# Patient Record
Sex: FEMALE | Race: BLACK OR AFRICAN AMERICAN | NOT HISPANIC OR LATINO | Employment: FULL TIME | URBAN - METROPOLITAN AREA
[De-identification: names, ages, dates, MRNs, and addresses within clinical notes are randomized per-mention and may not be internally consistent; named-entity substitution may affect disease eponyms.]

---

## 2017-02-02 ENCOUNTER — LAB VISIT (OUTPATIENT)
Dept: LAB | Facility: HOSPITAL | Age: 27
End: 2017-02-02
Attending: OBSTETRICS & GYNECOLOGY
Payer: COMMERCIAL

## 2017-02-02 ENCOUNTER — OFFICE VISIT (OUTPATIENT)
Dept: OBSTETRICS AND GYNECOLOGY | Facility: CLINIC | Age: 27
End: 2017-02-02
Payer: COMMERCIAL

## 2017-02-02 VITALS
HEIGHT: 67 IN | BODY MASS INDEX: 41.46 KG/M2 | SYSTOLIC BLOOD PRESSURE: 124 MMHG | DIASTOLIC BLOOD PRESSURE: 74 MMHG | WEIGHT: 264.13 LBS

## 2017-02-02 DIAGNOSIS — Z11.3 SCREENING FOR GONORRHEA: ICD-10-CM

## 2017-02-02 DIAGNOSIS — N76.0 BACTERIAL VAGINOSIS: ICD-10-CM

## 2017-02-02 DIAGNOSIS — Z12.4 SCREENING FOR CERVICAL CANCER: ICD-10-CM

## 2017-02-02 DIAGNOSIS — N30.00 ACUTE CYSTITIS WITHOUT HEMATURIA: ICD-10-CM

## 2017-02-02 DIAGNOSIS — B96.89 BACTERIAL VAGINOSIS: ICD-10-CM

## 2017-02-02 DIAGNOSIS — Z01.419 ENCOUNTER FOR GYNECOLOGICAL EXAMINATION (GENERAL) (ROUTINE) WITHOUT ABNORMAL FINDINGS: Primary | ICD-10-CM

## 2017-02-02 PROCEDURE — 87186 SC STD MICRODIL/AGAR DIL: CPT

## 2017-02-02 PROCEDURE — 87088 URINE BACTERIA CULTURE: CPT

## 2017-02-02 PROCEDURE — 87480 CANDIDA DNA DIR PROBE: CPT

## 2017-02-02 PROCEDURE — 88175 CYTOPATH C/V AUTO FLUID REDO: CPT

## 2017-02-02 PROCEDURE — 86592 SYPHILIS TEST NON-TREP QUAL: CPT

## 2017-02-02 PROCEDURE — 99999 PR PBB SHADOW E&M-EST. PATIENT-LVL II: CPT | Mod: PBBFAC,,, | Performed by: OBSTETRICS & GYNECOLOGY

## 2017-02-02 PROCEDURE — 86703 HIV-1/HIV-2 1 RESULT ANTBDY: CPT

## 2017-02-02 PROCEDURE — 87591 N.GONORRHOEAE DNA AMP PROB: CPT

## 2017-02-02 PROCEDURE — 87086 URINE CULTURE/COLONY COUNT: CPT

## 2017-02-02 PROCEDURE — 99395 PREV VISIT EST AGE 18-39: CPT | Mod: S$GLB,,, | Performed by: OBSTETRICS & GYNECOLOGY

## 2017-02-02 PROCEDURE — 87077 CULTURE AEROBIC IDENTIFY: CPT

## 2017-02-02 PROCEDURE — 36415 COLL VENOUS BLD VENIPUNCTURE: CPT | Mod: PO

## 2017-02-02 PROCEDURE — 87529 HSV DNA AMP PROBE: CPT

## 2017-02-02 RX ORDER — PHENTERMINE HYDROCHLORIDE 37.5 MG/1
37.5 TABLET ORAL DAILY
Refills: 0 | COMMUNITY
Start: 2016-11-21 | End: 2020-01-21

## 2017-02-02 NOTE — MR AVS SNAPSHOT
"    [facility]  [address]  [place] [address]  Phone: [phone]                  Racquel Yepez   2017 3:30 PM   Office Visit    Description:  Female : 1990   Provider:  Zulema Jarvis MD   Department:  [facility]           Reason for Visit     Annual Exam     STD CHECK           Diagnoses this Visit        Comments    Encounter for gynecological examination (general) (routine) without abnormal findings         Screening for cervical cancer         Screening for gonorrhea                To Do List           Future Appointments        Provider Department Dept Phone    3/1/2017 4:30 PM Zulema Jarvis MD [facility] - OB/ [phone]      Goals (5 Years of Data)     None      [facility]     [facility] On Call Nurse Care Line -  Assistance  Registered nurses in the [facility] provide clinical advisement, health education, appointment booking, and other advisory services.  Call for this free service at [phone].             Medications                Verify that the below list of medications is an accurate representation of the medications you are currently taking.  If none reported, the list may be blank. If incorrect, please contact your healthcare provider. Carry this list with you in case of emergency.           Current Medications     norethindrone-ethinyl estradiol (MICROGESTIN ) 1-20 mg-mcg per tablet Take 1 tablet by mouth once daily.    phentermine (ADIPEX-P) 37.5 mg tablet Take 37.5 mg by mouth once daily.           Clinical Reference Information           Your Vitals Were     BP Height Weight Last Period BMI    124/74 (BP Location: Right arm, Patient Position: Sitting, BP Method: Manual) 5' 7" (1.702 m) 119.8 kg (264 lb 1.8 oz) 2017 41.37 kg/m2      Blood Pressure          Most Recent Value    BP  124/74      Allergies as of 2017     No Known Allergies      Immunizations Administered " on Date of Encounter - 2/2/2017     None      Orders Placed During Today's Visit      Normal Orders This Visit    C. trachomatis/N. gonorrhoeae by AMP DNA Cervicovaginal     Liquid-based pap smear, screening     Future Labs/Procedures Expected by Expires    Herpes simplex Virus (HSV) Type 1 & 2 DNA by PCR  2/2/2017 4/3/2018    HIV-1 and HIV-2 antibodies  2/2/2017 4/3/2018    RPR  2/2/2017 4/3/2018      [facility] Sign-Up     Activating your [facility] account is as easy as 1-2-3!     1) Visit [email or web address], select Sign Up Now, enter this activation code and your date of birth, then select Next.  [email or web address]  Expires: 3/19/2017  4:20 PM      2) Create a username and password to use when you visit [facility] in the future and select a security question in case you lose your password and select Next.    3) Enter your e-mail address and click Sign Up!    Additional Information  If you have questions, please e-mail [email or web address] or call [phone] to talk to our [facility] staff. Remember, [facility] is NOT to be used for urgent needs. For medical emergencies, dial 911.         Language Assistance Services     ATTENTION: Language assistance services are available, free of charge. Please call [phone].      ATENCIÓN: Si habla español, tiene a herring disposición servicios gratuitos de asistencia lingüística. Llame al [phone].     CHÚ Ý: N?u b?n nói Ti?ng Vi?t, có các d?ch v? h? tr? ngôn ng? mi?n phí dành cho b?n. G?i s? [phone].         [facility] complies with applicable Federal civil rights laws and does not discriminate on the basis of race, color, national origin, age, disability, or sex.

## 2017-02-03 LAB
HIV 1+2 AB+HIV1 P24 AG SERPL QL IA: NEGATIVE
RPR SER QL: NORMAL

## 2017-02-04 LAB
CANDIDA RRNA VAG QL PROBE: NEGATIVE
G VAGINALIS RRNA GENITAL QL PROBE: POSITIVE
HSV-1 DNA BY PCR: NEGATIVE
HSV-2 DNA BY PCR: NEGATIVE
T VAGINALIS RRNA GENITAL QL PROBE: NEGATIVE

## 2017-02-04 NOTE — PROGRESS NOTES
Subjective:       Patient ID: Racquel Yepez is a 26 y.o. female.    Chief Complaint:  Annual Exam and STD CHECK      History of Present Illness  HPI  Annual Exam-Premenopausal  Patient presents for annual exam. The patient has no complaints today. The patient is sexually active. --deneis vaginal discharge but wants std testing; GYN screening history: last pap: approximate date  and was normal. The patient wears seatbelts: yes. The patient participates in regular exercise: yes. Has the patient ever been transfused or tattooed?: yes. The patient reports that there is not domestic violence in her life.      Currently has nexplanon in place; feels her weigth gain and inability to lose weight are tied to nexplanon; currently on adipex; but not exercising  Reports was previously able to lose weight on microgestin        GYN & OB History  Patient's last menstrual period was 2017.   Date of Last Pap: No result found    OB History    Para Term  AB SAB TAB Ectopic Multiple Living   2 1 1 0 1 0 0 1 0 1      # Outcome Date GA Lbr Jovany/2nd Weight Sex Delivery Anes PTL Lv   2 Term 01/31/15    F CS-LTranv   Y   1 Ectopic 14 4w0d      N N          Review of Systems  Review of Systems   Constitutional: Negative for activity change, appetite change, chills, diaphoresis, fatigue, fever and unexpected weight change.   HENT: Negative for mouth sores and tinnitus.    Eyes: Negative for discharge and visual disturbance.   Respiratory: Negative for cough, shortness of breath and wheezing.    Cardiovascular: Negative for chest pain, palpitations and leg swelling.   Gastrointestinal: Negative for abdominal pain, bloating, blood in stool, constipation, diarrhea, nausea and vomiting.   Endocrine: Negative for diabetes, hair loss, hot flashes, hyperthyroidism and hypothyroidism.   Genitourinary: Negative for decreased libido, dyspareunia, dysuria, flank pain, frequency, genital sores, hematuria, menorrhagia,  menstrual problem, pelvic pain, urgency, vaginal bleeding, vaginal discharge, vaginal pain, dysmenorrhea, urinary incontinence, postcoital bleeding, postmenopausal bleeding and vaginal odor.   Musculoskeletal: Negative for back pain and myalgias.   Skin:  Negative for rash, no acne and hair changes.   Neurological: Negative for seizures, syncope, numbness and headaches.   Hematological: Negative for adenopathy. Does not bruise/bleed easily.   Psychiatric/Behavioral: Negative for depression and sleep disturbance. The patient is not nervous/anxious.    Breast: Negative for breast mass, breast pain, nipple discharge and skin changes          Objective:    Physical Exam:   Constitutional: She appears well-developed.     Eyes: Conjunctivae and EOM are normal. Pupils are equal, round, and reactive to light.    Neck: Normal range of motion. Neck supple.     Pulmonary/Chest: Effort normal. Right breast exhibits no mass, no nipple discharge, no skin change and no tenderness. Left breast exhibits no mass, no nipple discharge, no skin change and no tenderness. Breasts are symmetrical.        Abdominal: Soft.     Genitourinary: Rectum normal, vagina normal and uterus normal. Pelvic exam was performed with patient supine. Cervix is normal. Right adnexum displays no mass and no tenderness. Left adnexum displays no mass and no tenderness. No erythema, bleeding, rectocele, cystocele or unspecified prolapse of vaginal walls in the vagina. No vaginal discharge found. Labial bartholins normal.       Uterus Size: 6 cm   Musculoskeletal: Normal range of motion.       Neurological: She is alert.    Skin: Skin is warm.    Psychiatric: She has a normal mood and affect.          Assessment:        1. Acute cystitis without hematuria    2. Encounter for gynecological examination (general) (routine) without abnormal findings    3. Screening for cervical cancer    4. Screening for gonorrhea    5. Bacterial vaginosis               Plan:       Continue annual well woman exam.   pap today  Gc/ct /affirm today  Return for nexplanon removal

## 2017-02-05 ENCOUNTER — TELEPHONE (OUTPATIENT)
Dept: OBSTETRICS AND GYNECOLOGY | Facility: CLINIC | Age: 27
End: 2017-02-05

## 2017-02-05 DIAGNOSIS — B96.89 BACTERIAL VAGINOSIS: Primary | ICD-10-CM

## 2017-02-05 DIAGNOSIS — N76.0 BACTERIAL VAGINOSIS: Primary | ICD-10-CM

## 2017-02-05 DIAGNOSIS — N39.0 URINARY TRACT INFECTION WITHOUT HEMATURIA, SITE UNSPECIFIED: ICD-10-CM

## 2017-02-06 LAB
BACTERIA UR CULT: NORMAL
C TRACH DNA SPEC QL NAA+PROBE: NEGATIVE
N GONORRHOEA DNA SPEC QL NAA+PROBE: NEGATIVE

## 2017-02-06 RX ORDER — NITROFURANTOIN (MACROCRYSTALS) 100 MG/1
100 CAPSULE ORAL EVERY 12 HOURS
Qty: 10 CAPSULE | Refills: 0 | Status: SHIPPED | OUTPATIENT
Start: 2017-02-06 | End: 2017-02-11

## 2017-02-06 RX ORDER — METRONIDAZOLE 500 MG/1
500 TABLET ORAL EVERY 12 HOURS
Qty: 14 TABLET | Refills: 0 | Status: SHIPPED | OUTPATIENT
Start: 2017-02-06 | End: 2017-02-13

## 2017-02-06 NOTE — TELEPHONE ENCOUNTER
Advised pt of positive culture for BV, she states she wants the pill for treatment.  ANJU Houston

## 2017-02-06 NOTE — TELEPHONE ENCOUNTER
Please advise her vaginal culture is positive for bacterial vaginosis.  This is overgrowth of your normal bacteria.  Does she prefer pills or vaginal gel for treatment?

## 2017-05-10 ENCOUNTER — TELEPHONE (OUTPATIENT)
Dept: OBSTETRICS AND GYNECOLOGY | Facility: CLINIC | Age: 27
End: 2017-05-10

## 2017-05-10 NOTE — TELEPHONE ENCOUNTER
----- Message from Chyna Parks sent at 5/10/2017 12:05 PM CDT -----  Contact: self [phone]  States that she needs to reschedule her procedure for nexplanon removal. Please call back at [phone]//thank you acc

## 2017-05-24 ENCOUNTER — PROCEDURE VISIT (OUTPATIENT)
Dept: OBSTETRICS AND GYNECOLOGY | Facility: CLINIC | Age: 27
End: 2017-05-24
Payer: COMMERCIAL

## 2017-05-24 VITALS
SYSTOLIC BLOOD PRESSURE: 104 MMHG | HEIGHT: 67 IN | BODY MASS INDEX: 44.63 KG/M2 | DIASTOLIC BLOOD PRESSURE: 68 MMHG | WEIGHT: 284.38 LBS

## 2017-05-24 DIAGNOSIS — Z30.46 ENCOUNTER FOR NEXPLANON REMOVAL: Primary | ICD-10-CM

## 2017-05-24 DIAGNOSIS — Z30.011 ENCOUNTER FOR INITIAL PRESCRIPTION OF CONTRACEPTIVE PILLS: ICD-10-CM

## 2017-05-24 PROCEDURE — 11982 REMOVE DRUG IMPLANT DEVICE: CPT | Mod: S$GLB,,, | Performed by: OBSTETRICS & GYNECOLOGY

## 2017-05-24 RX ORDER — NORETHINDRONE ACETATE AND ETHINYL ESTRADIOL .02; 1 MG/1; MG/1
1 TABLET ORAL DAILY
Qty: 28 TABLET | Refills: 11 | Status: SHIPPED | OUTPATIENT
Start: 2017-05-24 | End: 2017-06-23

## 2017-05-24 NOTE — PROCEDURES
Procedures   NEXPLANON REMOVAL    Surgeon: Dr. Zulema Jarvis  Date of procedure: 05/24/2017    Pre-op diagnosis:   Encounter Diagnoses   Name Primary?    Encounter for Nexplanon removal Yes    Encounter for initial prescription of contraceptive pills          Exam: Nexplanon easily palpated in left upper extremity    Procedure: The skin over the distal tip of the nexplanon was prepped with betadine.    2 cc 1%   lidocaine without epinephrine was injected subcutaneously.  A 3mm incision was made in the left arm at the distal  tip of the implant using a #11 scalpel.  The device was pushed toward the incision, grasped with hemostats, and was removed intact.  Hemostasis was achieved with gentle pressure.  The wound was dressed with a steri strip and  band-aid.  The patient tolerated the procedure well.    Post-procedure counseling: The patient was given pain, fever, and bleeding precautions.  Advised to use tylenol and ibuprofen prn pain.  Pt advised to  and start ocp today  RTC: prn    Post-op diagnosis:   Encounter Diagnoses   Name Primary?    Encounter for Nexplanon removal Yes    Encounter for initial prescription of contraceptive pills

## 2018-07-19 ENCOUNTER — OFFICE VISIT (OUTPATIENT)
Dept: OBSTETRICS AND GYNECOLOGY | Facility: CLINIC | Age: 28
End: 2018-07-19
Payer: COMMERCIAL

## 2018-07-19 VITALS
BODY MASS INDEX: 36.99 KG/M2 | HEIGHT: 67 IN | DIASTOLIC BLOOD PRESSURE: 86 MMHG | WEIGHT: 235.69 LBS | SYSTOLIC BLOOD PRESSURE: 130 MMHG

## 2018-07-19 DIAGNOSIS — Z30.017 ENCOUNTER FOR INITIAL PRESCRIPTION OF IMPLANTABLE SUBDERMAL CONTRACEPTIVE: ICD-10-CM

## 2018-07-19 DIAGNOSIS — Z00.00 PREVENTATIVE HEALTH CARE: ICD-10-CM

## 2018-07-19 DIAGNOSIS — Z01.419 ENCOUNTER FOR GYNECOLOGICAL EXAMINATION (GENERAL) (ROUTINE) WITHOUT ABNORMAL FINDINGS: Primary | ICD-10-CM

## 2018-07-19 PROCEDURE — 3008F BODY MASS INDEX DOCD: CPT | Mod: CPTII,S$GLB,, | Performed by: NURSE PRACTITIONER

## 2018-07-19 PROCEDURE — 99395 PREV VISIT EST AGE 18-39: CPT | Mod: S$GLB,,, | Performed by: NURSE PRACTITIONER

## 2018-07-19 PROCEDURE — 99999 PR PBB SHADOW E&M-EST. PATIENT-LVL II: CPT | Mod: PBBFAC,,, | Performed by: NURSE PRACTITIONER

## 2018-07-19 NOTE — PROGRESS NOTES
"CC: Well woman exam    Racquel Yepez is a 27 y.o. female  presents for well woman exam.  LMP: Patient's last menstrual period was 2018..  Last pap exam was normal, . S/P Laparotomy for left corneal pregnancy. Is sexually active, no birth control. Wants to consider the Nexplanon.     Past Medical History:   Diagnosis Date    Abnormal Pap smear     colposcopy-- normal since    Anemia     Ectopic pregnancy      Past Surgical History:   Procedure Laterality Date    ABSCESS DRAINAGE  2016    left breast    CHOLECYSTECTOMY      ECTOPIC PREGNANCY SURGERY  3/3/2014    Laparotomy for left cornual pregnancy     gastric sleeve  2017     Social History     Social History    Marital status: Single     Spouse name: N/A    Number of children: N/A    Years of education: N/A     Occupational History    Not on file.     Social History Main Topics    Smoking status: Never Smoker    Smokeless tobacco: Never Used    Alcohol use Yes      Comment: socially    Drug use: No    Sexual activity: Yes     Partners: Male     Birth control/ protection: Implant     Other Topics Concern    Not on file     Social History Narrative    No narrative on file     Family History   Problem Relation Age of Onset    Diabetes Father     Hypertension Father     Diabetes Mother     Breast cancer Neg Hx     Colon cancer Neg Hx     Ovarian cancer Neg Hx     Stroke Neg Hx      OB History      Para Term  AB Living    2 1 1 0 1 1    SAB TAB Ectopic Multiple Live Births    0 0 1 0 2          /86   Ht 5' 7" (1.702 m)   Wt 106.9 kg (235 lb 10.8 oz)   LMP 2018   BMI 36.91 kg/m²       ROS:  GENERAL:    SKIN: Denies rash or lesions.   HEAD: Denies head injury or headache.   NODES: Denies enlarged lymph nodes.   CHEST: Denies chest pain or shortness of breath.   CARDIOVASCULAR: Denies palpitations or left sided chest pain.   ABDOMEN: No abdominal pain, constipation, diarrhea, nausea, " vomiting or rectal bleeding.   URINARY: No frequency, dysuria, hematuria, or burning on urination.  REPRODUCTIVE: See HPI.   BREASTS: The patient performs breast self-examination and denies pain, lumps, or nipple discharge.   HEMATOLOGIC: No easy bruisability or excessive bleeding.   MUSCULOSKELETAL: Denies joint pain or swelling.   NEUROLOGIC: Denies syncope or weakness.   PSYCHIATRIC: Denies depression, anxiety or mood swings.    PHYSICAL EXAM:  APPEARANCE: Well nourished, well developed, in no acute distress.  AFFECT: WNL, alert and oriented x 3  SKIN: No acne or hirsutism  NECK: Neck symmetric without masses or thyromegaly  NODES: No inguinal, cervical, axillary, or femoral lymph node enlargement  CHEST: Good respiratory effect  ABDOMEN: Soft.  No tenderness or masses.  No hepatosplenomegaly.  No hernias.  BREASTS: Symmetrical, no skin changes or visible lesions.  No palpable masses, nipple discharge bilaterally.  PELVIC: Normal external genitalia without lesions.  Normal hair distribution.  Adequate perineal body, normal urethral meatus.  Vagina moist and well rugated without lesions or discharge.  Cervix pink, without lesions, discharge or tenderness.  No significant cystocele or rectocele.  Bimanual exam shows uterus to be normal size, regular, mobile and nontender.  Adnexa without masses or tenderness.    EXTREMITIES: No edema.    PLAN:  Nexplanon ordered  Patient was counseled today on A.C.S. Pap guidelines and recommendations for yearly pelvic exams, mammograms and monthly self breast exams; to see her PCP for other health maintenance.

## 2018-08-08 ENCOUNTER — TELEPHONE (OUTPATIENT)
Dept: OBSTETRICS AND GYNECOLOGY | Facility: CLINIC | Age: 28
End: 2018-08-08

## 2018-08-08 NOTE — TELEPHONE ENCOUNTER
----- Message from Molly Velasquez sent at 8/8/2018  4:11 PM CDT -----  Contact: pt  States she's calling to see if her birth control has came in. Please call pt at [phone]. Thank you

## 2018-08-10 ENCOUNTER — TELEPHONE (OUTPATIENT)
Dept: OBSTETRICS AND GYNECOLOGY | Facility: CLINIC | Age: 28
End: 2018-08-10

## 2018-08-10 NOTE — TELEPHONE ENCOUNTER
Nexplanon arrived at [facility]. Scheduled for 8/24/18 745 AM. Patient verbalized understanding

## 2018-08-15 ENCOUNTER — PROCEDURE VISIT (OUTPATIENT)
Dept: OBSTETRICS AND GYNECOLOGY | Facility: CLINIC | Age: 28
End: 2018-08-15
Payer: COMMERCIAL

## 2018-08-15 ENCOUNTER — TELEPHONE (OUTPATIENT)
Dept: OBSTETRICS AND GYNECOLOGY | Facility: CLINIC | Age: 28
End: 2018-08-15

## 2018-08-15 VITALS
WEIGHT: 226.63 LBS | BODY MASS INDEX: 35.57 KG/M2 | SYSTOLIC BLOOD PRESSURE: 130 MMHG | DIASTOLIC BLOOD PRESSURE: 84 MMHG | HEIGHT: 67 IN

## 2018-08-15 DIAGNOSIS — Z30.017 NEXPLANON INSERTION: Primary | ICD-10-CM

## 2018-08-15 PROCEDURE — 11981 INSERTION DRUG DLVR IMPLANT: CPT | Mod: S$GLB,,, | Performed by: NURSE PRACTITIONER

## 2018-08-15 NOTE — PROCEDURES
Procedures   Nexplanon INSERTION:    PRE-Nexplanon INSERTION COUNSELING:  All contraceptive options were reviewed and the patient chooses Nexplanon.  Patients history was reviewed and there were no contraindications to Nexplanon.  The procedure and minimal risks of pain, bleeding, bruising and infection at the insertion site discussed. Possible irregular menstrual bleeding pattern versus amenorrhea was explained.  No protection against STDs discussed.  Written information provided; all questions answered and patient agrees to proceed.  Consent signed and scanned into computer.    EXAM:  With patient in supine position the nondominant left arm was flexed at the elbow and externally rotated.  The insertion site was identified 6-8 cm above the elbow crease at the inner side of the upper arm overlying the groove between biceps and triceps.    PROCEDURE:  The insertion site was prepped with antiseptic and injected with 3 cc of 1% Xylocaine without epinephrine subq along the planned insertion canal.    Using sterile technique the Nexplanon applicator was visually verified and removed from the blister pack.  The needle tip was inserted bevel side up at a 20 degree angle to penetrate the skin.  The applicator was lowered parallel to the arm and the skin was tented with the needle.  Once the needle was completely inserted, the Nexplanon was then deployed into the subcutaneous space.  The implant was palpable after insertion.  A steri strip was placed over the insertion site.  The patient tolerated the procedure well.    ASSESSMENT:  Nexplanon Insertion    POST Nexplanon INSERTION COUNSELING:  Manage post Implanon placement arm pain with NSAIDs  Keep arm elevated and apply intermittent ice packs to decrease pain and bruising for 24 Hours.  May remove bandage in 24 hours.  Nexplanon danger signs (worsening pain at insertion site, bleeding through bandage, redness and/or pus drainage at insertion site).  Removal in 3  years.    Lot # [id number]

## 2018-08-15 NOTE — TELEPHONE ENCOUNTER
Spoke with pt, regarding rescheduling appt for Nexplanon placement. Pt stated that her cycle began today. Pt rescheduled.

## 2018-08-15 NOTE — TELEPHONE ENCOUNTER
----- Message from Dorene Biggs sent at 8/15/2018  8:11 AM CDT -----  Pt at [phone]//states she has an appt scheduled for a procedure on 8/24/18//to have the Nexplanon put in//she started her period today//please call to discuss//thanks//[facility]

## 2019-04-24 ENCOUNTER — OFFICE VISIT (OUTPATIENT)
Dept: DERMATOLOGY | Facility: CLINIC | Age: 29
End: 2019-04-24
Payer: COMMERCIAL

## 2019-04-24 DIAGNOSIS — L81.0 POST-INFLAMMATORY HYPERPIGMENTATION: Primary | ICD-10-CM

## 2019-04-24 PROCEDURE — 99999 PR PBB SHADOW E&M-EST. PATIENT-LVL II: ICD-10-PCS | Mod: PBBFAC,,, | Performed by: DERMATOLOGY

## 2019-04-24 PROCEDURE — 99203 PR OFFICE/OUTPT VISIT, NEW, LEVL III, 30-44 MIN: ICD-10-PCS | Mod: S$GLB,,, | Performed by: DERMATOLOGY

## 2019-04-24 PROCEDURE — 99999 PR PBB SHADOW E&M-EST. PATIENT-LVL II: CPT | Mod: PBBFAC,,, | Performed by: DERMATOLOGY

## 2019-04-24 PROCEDURE — 99203 OFFICE O/P NEW LOW 30 MIN: CPT | Mod: S$GLB,,, | Performed by: DERMATOLOGY

## 2019-04-24 RX ORDER — HYDROQUINONE 40 MG/G
CREAM TOPICAL
Qty: 28 G | Refills: 1 | Status: SHIPPED | OUTPATIENT
Start: 2019-04-24 | End: 2020-01-21

## 2019-04-24 NOTE — PROGRESS NOTES
Subjective:       Patient ID:  Racquel Yepez is a 28 y.o. female who presents for   Chief Complaint   Patient presents with    Skin Discoloration     all over body x several years, no tx     History of Present Illness: The patient presents with chief complaint of dark marks.  Location: all over body  Duration: several years  Signs/Symptoms: none    Prior treatments: ambi, jamil's      Skin Discoloration         Review of Systems   Constitutional: Negative for fever and chills.   Gastrointestinal: Negative for nausea and vomiting.   Skin: Negative for daily sunscreen use, activity-related sunscreen use and recent sunburn.   Hematologic/Lymphatic: Does not bruise/bleed easily.        Objective:    Physical Exam   Constitutional: She appears well-developed and well-nourished. No distress.   Neurological: She is alert and oriented to person, place, and time. She is not disoriented.   Psychiatric: She has a normal mood and affect.   Skin:   Areas Examined (abnormalities noted in diagram):   Head / Face Inspection Performed  Neck Inspection Performed  Chest / Axilla Inspection Performed  Abdomen Inspection Performed  Back Inspection Performed  RUE Inspected  LUE Inspection Performed  RLE Inspected  LLE Inspection Performed  Nails and Digits Inspection Performed                  Assessment / Plan:        Post-inflammatory hyperpigmentation  -     hydroquinone 4 % Crea; Apply small amount twice daily to dark spots  Dispense: 28 g; Refill: 1  -     On legs, back and face.  Recommend daily sunscreen for face.              Follow up if symptoms worsen or fail to improve.

## 2020-01-21 ENCOUNTER — OFFICE VISIT (OUTPATIENT)
Dept: OBSTETRICS AND GYNECOLOGY | Facility: CLINIC | Age: 30
End: 2020-01-21
Payer: COMMERCIAL

## 2020-01-21 VITALS
BODY MASS INDEX: 36.43 KG/M2 | DIASTOLIC BLOOD PRESSURE: 80 MMHG | SYSTOLIC BLOOD PRESSURE: 118 MMHG | HEIGHT: 67 IN | WEIGHT: 232.13 LBS

## 2020-01-21 DIAGNOSIS — Z72.51 HIGH RISK HETEROSEXUAL BEHAVIOR: ICD-10-CM

## 2020-01-21 DIAGNOSIS — Z30.46 ENCOUNTER FOR SURVEILLANCE OF IMPLANTABLE SUBDERMAL CONTRACEPTIVE: ICD-10-CM

## 2020-01-21 DIAGNOSIS — Z12.4 SCREENING FOR CERVICAL CANCER: ICD-10-CM

## 2020-01-21 DIAGNOSIS — Z01.419 ENCOUNTER FOR GYNECOLOGICAL EXAMINATION (GENERAL) (ROUTINE) WITHOUT ABNORMAL FINDINGS: Primary | ICD-10-CM

## 2020-01-21 PROCEDURE — 99395 PR PREVENTIVE VISIT,EST,18-39: ICD-10-PCS | Mod: S$GLB,,, | Performed by: OBSTETRICS & GYNECOLOGY

## 2020-01-21 PROCEDURE — 99395 PREV VISIT EST AGE 18-39: CPT | Mod: S$GLB,,, | Performed by: OBSTETRICS & GYNECOLOGY

## 2020-01-21 PROCEDURE — 99999 PR PBB SHADOW E&M-EST. PATIENT-LVL III: CPT | Mod: PBBFAC,,, | Performed by: OBSTETRICS & GYNECOLOGY

## 2020-01-21 PROCEDURE — 99999 PR PBB SHADOW E&M-EST. PATIENT-LVL III: ICD-10-PCS | Mod: PBBFAC,,, | Performed by: OBSTETRICS & GYNECOLOGY

## 2020-01-21 PROCEDURE — 88175 CYTOPATH C/V AUTO FLUID REDO: CPT

## 2020-01-21 PROCEDURE — 87491 CHLMYD TRACH DNA AMP PROBE: CPT

## 2020-01-21 NOTE — PROGRESS NOTES
Subjective:       Patient ID: Racquel Yepez is a 29 y.o. female.    Chief Complaint:  Well Woman      History of Present Illness  HPI  Annual Exam-Premenopausal  Patient presents for annual exam. The patient has no complaints today--wants std testing. The patient is sexually active--nexplanon for contraception; 2 partners in 12 mo; . GYN screening history: last pap: approximate date  and was normal. The patient wears seatbelts: yes. The patient participates in regular exercise: yes.--dangelo by rt knee injury;  Has the patient ever been transfused or tattooed?: yes. --+tattooes; The patient reports that there is not domestic violence in her life.    Menses irreg but monthly, flow 4 days; reg tampon, change q 3-4 hrs; min dysmenorrhea;         GYN & OB History  Patient's last menstrual period was 2019.   Date of Last Pap: 2017    OB History    Para Term  AB Living   2 1 1 0 1 1   SAB TAB Ectopic Multiple Live Births   0 0 1 0 2      # Outcome Date GA Lbr Jovany/2nd Weight Sex Delivery Anes PTL Lv   2 Term 01/31/15    F CS-LTranv   HAL   1 Ectopic 14 4w0d      N DEC       Review of Systems  Review of Systems   All other systems reviewed and are negative.          Objective:      Physical Exam:   Constitutional: She appears well-developed.     Eyes: Pupils are equal, round, and reactive to light. Conjunctivae and EOM are normal.    Neck: Normal range of motion. Neck supple.     Pulmonary/Chest: Effort normal. Right breast exhibits no mass, no nipple discharge, no skin change and no tenderness. Left breast exhibits no mass, no nipple discharge, no skin change and no tenderness. Breasts are symmetrical.        Abdominal: Soft.     Genitourinary: Rectum normal, vagina normal and uterus normal. Pelvic exam was performed with patient supine. Cervix is normal. Right adnexum displays no mass and no tenderness. Left adnexum displays no mass and no tenderness. No erythema, bleeding, rectocele,  cystocele or unspecified prolapse of vaginal walls in the vagina. No vaginal discharge (white) found. Labial bartholins normal.       Uterus Size: 6 cm   Musculoskeletal: Normal range of motion.       Neurological: She is alert.    Skin: Skin is warm.    Psychiatric: She has a normal mood and affect.           Assessment:     Encounter Diagnoses   Name Primary?    Encounter for gynecological examination (general) (routine) without abnormal findings Yes    Screening for cervical cancer     High risk heterosexual behavior     Encounter for surveillance of implantable subdermal contraceptive                  Plan:      Continue annual well woman exam.  Pap today; Reviewed updated recommendations for pap smears (every 3 years) in low risk patients.   Recommend annual pelvic exams.  Reviewed recommendations for annual CBE.  Gc/ct today  Safe sex  Aware nexplanon due to be replaced 8/15/2021  Continue diet, exercise, weight loss

## 2020-01-23 LAB
C TRACH DNA SPEC QL NAA+PROBE: NOT DETECTED
N GONORRHOEA DNA SPEC QL NAA+PROBE: NOT DETECTED

## 2020-02-11 LAB
FINAL PATHOLOGIC DIAGNOSIS: NORMAL
Lab: NORMAL

## 2020-04-30 ENCOUNTER — TELEPHONE (OUTPATIENT)
Dept: ORTHOPEDICS | Facility: CLINIC | Age: 30
End: 2020-04-30

## 2020-04-30 ENCOUNTER — PATIENT MESSAGE (OUTPATIENT)
Dept: ORTHOPEDICS | Facility: CLINIC | Age: 30
End: 2020-04-30

## 2020-04-30 NOTE — TELEPHONE ENCOUNTER
Phone would ring -- unable to leave message in regards to appointment being changed to a virtual visit - will reach out to patient via portal

## 2020-05-04 ENCOUNTER — OFFICE VISIT (OUTPATIENT)
Dept: ORTHOPEDICS | Facility: CLINIC | Age: 30
End: 2020-05-04
Payer: MEDICAID

## 2020-05-04 ENCOUNTER — TELEPHONE (OUTPATIENT)
Dept: ORTHOPEDICS | Facility: CLINIC | Age: 30
End: 2020-05-04

## 2020-05-04 ENCOUNTER — PATIENT MESSAGE (OUTPATIENT)
Dept: ORTHOPEDICS | Facility: CLINIC | Age: 30
End: 2020-05-04

## 2020-05-04 DIAGNOSIS — R52 PAIN: Primary | ICD-10-CM

## 2020-05-04 PROCEDURE — 99499 UNLISTED E&M SERVICE: CPT | Mod: 95,,, | Performed by: PHYSICIAN ASSISTANT

## 2020-05-04 PROCEDURE — 99499 NO LOS: ICD-10-PCS | Mod: 95,,, | Performed by: PHYSICIAN ASSISTANT

## 2020-05-04 NOTE — PROGRESS NOTES
Pt left the visit prior to me getting connected through the patient portal.  I have message the patient and tried contacting her via phone on multiple occasions.  Will be happy to see her later today or this week if she would like to get rescheduled

## 2020-05-04 NOTE — TELEPHONE ENCOUNTER
8:30 am   Patient had tripped in for her virtual visit.  When I went to join the call, it said she had already left.  I attempted to call the patient so that she could sign back in for the appointment.  I also offered a face-to-face appointment later today if she would like as we have started seeing patients back in the clinic again.  I will follow this up with an e-mail to the patient through the portal as she did not answer the phone.

## 2020-05-04 NOTE — TELEPHONE ENCOUNTER
9:15am  3rd attempt.  Called patient for the 3rd time to check on her with regards to completing her virtual visit.  I had to leave another VM.  We will cancel the appointment for now and have advised we will be happy to get her rescheduled if needed.

## 2020-05-04 NOTE — TELEPHONE ENCOUNTER
Vikas Clement  I was unable to get connected with you for the virtual visit today.  It had said you had already left the virtual waiting room.  I was finishing up a visit prior to your appointment when you initially logged in.  If you would still like to be seen, please sign back in for the appointment.  Also, we have appointments available later today for face-to-face visits within the clinic setting.  This decision was made late on Friday.  I would be happy to see you for your knee problems today or even sometime later this week in the clinic if you prefer.  Please respond directly to this e-mail or call [phone] and let us know your preference.  Delfina Olvera PA-C  [facility]  [facility]

## 2020-05-28 ENCOUNTER — TELEPHONE (OUTPATIENT)
Dept: ORTHOPEDICS | Facility: CLINIC | Age: 30
End: 2020-05-28

## 2020-06-01 DIAGNOSIS — M25.561 RIGHT KNEE PAIN, UNSPECIFIED CHRONICITY: Primary | ICD-10-CM

## 2020-06-04 ENCOUNTER — OFFICE VISIT (OUTPATIENT)
Dept: ORTHOPEDICS | Facility: CLINIC | Age: 30
End: 2020-06-04
Payer: MEDICAID

## 2020-06-04 ENCOUNTER — HOSPITAL ENCOUNTER (OUTPATIENT)
Dept: RADIOLOGY | Facility: HOSPITAL | Age: 30
Discharge: HOME | End: 2020-06-04
Attending: PHYSICIAN ASSISTANT
Payer: MEDICAID

## 2020-06-04 VITALS
BODY MASS INDEX: 36.41 KG/M2 | HEART RATE: 89 BPM | SYSTOLIC BLOOD PRESSURE: 124 MMHG | DIASTOLIC BLOOD PRESSURE: 80 MMHG | WEIGHT: 232 LBS | HEIGHT: 67 IN

## 2020-06-04 DIAGNOSIS — M25.561 ANTERIOR KNEE PAIN, RIGHT: Primary | ICD-10-CM

## 2020-06-04 DIAGNOSIS — M25.561 RIGHT KNEE PAIN, UNSPECIFIED CHRONICITY: ICD-10-CM

## 2020-06-04 DIAGNOSIS — M94.261 CHONDROMALACIA OF KNEE, RIGHT: ICD-10-CM

## 2020-06-04 PROCEDURE — 73562 X-RAY EXAM OF KNEE 3: CPT | Mod: TC,LT

## 2020-06-04 PROCEDURE — 73562 X-RAY EXAM OF KNEE 3: CPT | Mod: 26,LT,, | Performed by: RADIOLOGY

## 2020-06-04 PROCEDURE — 99999 PR PBB SHADOW E&M-EST. PATIENT-LVL IV: CPT | Mod: PBBFAC,,, | Performed by: PHYSICIAN ASSISTANT

## 2020-06-04 PROCEDURE — 99203 OFFICE O/P NEW LOW 30 MIN: CPT | Mod: S$PBB,,, | Performed by: PHYSICIAN ASSISTANT

## 2020-06-04 PROCEDURE — 99214 OFFICE O/P EST MOD 30 MIN: CPT | Mod: PBBFAC,25 | Performed by: PHYSICIAN ASSISTANT

## 2020-06-04 PROCEDURE — 99999 PR PBB SHADOW E&M-EST. PATIENT-LVL IV: ICD-10-PCS | Mod: PBBFAC,,, | Performed by: PHYSICIAN ASSISTANT

## 2020-06-04 PROCEDURE — 99203 PR OFFICE/OUTPT VISIT, NEW, LEVL III, 30-44 MIN: ICD-10-PCS | Mod: S$PBB,,, | Performed by: PHYSICIAN ASSISTANT

## 2020-06-04 PROCEDURE — 73564 XR KNEE ORTHO RIGHT WITH FLEXION: ICD-10-PCS | Mod: 26,RT,, | Performed by: RADIOLOGY

## 2020-06-04 PROCEDURE — 73562 XR KNEE ORTHO RIGHT WITH FLEXION: ICD-10-PCS | Mod: 26,LT,, | Performed by: RADIOLOGY

## 2020-06-04 PROCEDURE — 73564 X-RAY EXAM KNEE 4 OR MORE: CPT | Mod: 26,RT,, | Performed by: RADIOLOGY

## 2020-06-04 RX ORDER — MELOXICAM 15 MG/1
15 TABLET ORAL DAILY
Qty: 30 TABLET | Refills: 0 | Status: SHIPPED | OUTPATIENT
Start: 2020-06-04 | End: 2021-01-27

## 2020-06-04 RX ORDER — TOPIRAMATE 50 MG/1
TABLET, FILM COATED ORAL
COMMUNITY
Start: 2020-05-18 | End: 2023-06-21 | Stop reason: ALTCHOICE

## 2020-06-04 RX ORDER — PHENTERMINE HYDROCHLORIDE 37.5 MG/1
37.5 TABLET ORAL
COMMUNITY
Start: 2020-05-21 | End: 2022-09-20

## 2020-06-04 NOTE — PROGRESS NOTES
Subjective:      Patient ID: Racquel Yepez is a 29 y.o. female.    Chief Complaint: Pain of the Right Knee      HPI: Racquel Yepez  is a 29 y.o. female who c/o Pain of the Right Knee   for duration of over a year.  She has no inciting injury.  She tells me that she played 3rd base in softball when she was younger.  [facility]  She was under a different insurance at that time.  Insurance is now under Medicaid and they do not accept it.  Quality is aching and constant.  She points anteriorly as to where the pain is located.  8/10 in severity.  Alleviating factors include nothing in particular.  Aggravating factors include going up and down stairs, nighttime, squatting.  She also complains of a noise.  She states that the noise isn't painful, but she does notice it getting much worse.  She had talked about corticosteroid injections in even potential surgery with [facility].  She was given a brace, as well.  She is describing a patellar stabilization brace.  She tells me it is more uncomfortable for her to use that during activity, so she does not wear it much    Past Medical History:   Diagnosis Date    Abnormal Pap smear     colposcopy-- normal since    Anemia     Ectopic pregnancy      Past Surgical History:   Procedure Laterality Date    ABSCESS DRAINAGE  2016    left breast     SECTION, LOW TRANSVERSE  2015    CHOLECYSTECTOMY      ECTOPIC PREGNANCY SURGERY  3/3/2014    Laparotomy for left cornual pregnancy     gastric sleeve  2017     Family History   Problem Relation Age of Onset    Diabetes Father     Hypertension Father     Diabetes Mother     Breast cancer Neg Hx     Colon cancer Neg Hx     Ovarian cancer Neg Hx     Stroke Neg Hx      Social History     Socioeconomic History    Marital status: Single     Spouse name: Not on file    Number of children: Not on file    Years of education:  Not on file    Highest education level: Not on file   Occupational History    Not on file   Social Needs    Financial resource strain: Not on file    Food insecurity:     Worry: Not on file     Inability: Not on file    Transportation needs:     Medical: Not on file     Non-medical: Not on file   Tobacco Use    Smoking status: Never Smoker    Smokeless tobacco: Never Used   Substance and Sexual Activity    Alcohol use: Yes     Frequency: 2-4 times a month     Drinks per session: 1 or 2     Binge frequency: Never     Comment: socially    Drug use: No    Sexual activity: Yes     Partners: Male     Birth control/protection: Implant   Lifestyle    Physical activity:     Days per week: Not on file     Minutes per session: Not on file    Stress: Not on file   Relationships    Social connections:     Talks on phone: Not on file     Gets together: Not on file     Attends Congregation service: Not on file     Active member of club or organization: Not on file     Attends meetings of clubs or organizations: Not on file     Relationship status: Not on file   Other Topics Concern    Not on file   Social History Narrative    Not on file     Medication List with Changes/Refills   New Medications    MELOXICAM (MOBIC) 15 MG TABLET    Take 1 tablet (15 mg total) by mouth once daily. Take with food.  Discontinue if you develop GI side effects.   Current Medications    ETONOGESTREL (NEXPLANON) 68 MG IMPL SUBDERMAL DEVICE    68 mg by Subdermal route.    PHENTERMINE (ADIPEX-P) 37.5 MG TABLET    Take 37.5 mg by mouth before breakfast.    TOPIRAMATE (TOPAMAX) 50 MG TABLET    TAKE ONE TABLET BY MOUTH ONE TIME DAILY     Review of patient's allergies indicates:  No Known Allergies    Review of Systems   Constitution: Negative for fever.   Cardiovascular: Negative for chest pain.   Respiratory: Negative for cough and shortness of breath.    Skin: Negative for rash.   Musculoskeletal: Positive for joint pain. Negative for joint  swelling and stiffness.   Gastrointestinal: Negative for heartburn.   Neurological: Negative for headaches and numbness.         Objective:        General    Nursing note and vitals reviewed.  Constitutional: She is oriented to person, place, and time. She appears well-developed and well-nourished.   HENT:   Head: Normocephalic and atraumatic.   Eyes: EOM are normal.   Cardiovascular: Normal rate and regular rhythm.    Pulmonary/Chest: Effort normal.   Abdominal: Soft.   Neurological: She is alert and oriented to person, place, and time.   Psychiatric: She has a normal mood and affect. Her behavior is normal.     General Musculoskeletal Exam   Gait: normal       Right Knee Exam     Inspection   Erythema: absent  Swelling: absent  Effusion: absent  Deformity: absent  Bruising: absent    Crepitus   The patient has crepitus of the patella (severe PF crepitus).    Range of Motion   Extension: normal   Flexion: normal     Tests   Meniscus   Ayana:  Medial - negative Lateral - negative  Ligament Examination Lachman: normal (-1 to 2mm) PCL-Posterior Drawer: normal (0 to 2mm)     MCL - Valgus: normal (0 to 2mm)  LCL - Varus: normal  Patella   Patellar apprehension: negative  Patellar Tracking: normal  Patellar Grind: positive    Other   Meniscal Cyst: absent  Popliteal (Baker's) Cyst: absent  Sensation: normal    Comments:  Comp soft, cap refill < 2 sec.    Left Knee Exam     Range of Motion   Extension: normal   Flexion: normal     Tests   Stability Lachman: normal (-1 to 2mm) PCL-Posterior Drawer: normal (0 to 2mm)  MCL - Valgus: normal (0 to 2mm)  LCL - Varus: normal (0 to 2mm)    Other   Sensation: normal    Muscle Strength   Right Lower Extremity   Quadriceps:  5/5   Hamstrin/5   Left Lower Extremity   Quadriceps:  5/5   Hamstrin/5     Vascular Exam       Edema  Right Lower Leg: absent  Left Lower Leg: absent              Xray images and report were reviewed today.  I agree with the radiologist's  interpretation.    X-ray Knee Ortho Right with Flexion  Narrative: EXAMINATION:  XR KNEE ORTHO RIGHT WITH FLEXION    CLINICAL HISTORY:  Pain in right knee    TECHNIQUE:  AP standing as well as PA flexion standing and Merchant views of both knees were performed.  A lateral view of the right knee is also performed.    COMPARISON:  None.    FINDINGS:  No acute osseous abnormality.  Joint spaces maintained with mild marginal osteophyte changes.  Right greater than left lateral patellar tilt noted.  Small right suprapatellar joint effusion possible.  Impression: As above    Electronically signed by: Vish Solis MD  Date:    06/04/2020  Time:    07:58        Assessment:       Encounter Diagnoses   Name Primary?    Anterior knee pain, right Yes    Chondromalacia of knee, right           Plan:       Racquel was seen today for pain.    Diagnoses and all orders for this visit:    Anterior knee pain, right  -     meloxicam (MOBIC) 15 MG tablet; Take 1 tablet (15 mg total) by mouth once daily. Take with food.  Discontinue if you develop GI side effects.  -     Ambulatory referral/consult to Physical/Occupational Therapy; Future    Chondromalacia of knee, right  -     meloxicam (MOBIC) 15 MG tablet; Take 1 tablet (15 mg total) by mouth once daily. Take with food.  Discontinue if you develop GI side effects.        Racquel Yepez is a new pt who comes in today for the above problems.  She has anterior knee pain.  She has laterally tracking patella on x-ray.  She has not done any physical therapy.  I would recommend formal physical therapy 1st and foremost.  I would also recommend getting on a prescription of meloxicam consistently for 2 weeks and then as needed after that.  She may use the brace for activity only if it helps her.  We have talked about the importance of activity modification as well.  I have given her home exercise program for patellofemoral pain.  I will see her back in the office in 2 months to  re-evaluate her progress.  We have briefly discussed corticosteroid injection.  We cannot certainly do an injection down the road if needed but will hold off for now.  She verbalizes understanding and agrees.    Follow up in about 2 months (around 8/4/2020).          The patient understands, chooses and consents to this plan and accepts all   the risks which include but are not limited to the risks mentioned above.     Disclaimer: This note was prepared using a voice recognition system and is likely to have sound alike errors within the text.

## 2020-06-11 ENCOUNTER — CLINICAL SUPPORT (OUTPATIENT)
Dept: REHABILITATION | Facility: HOSPITAL | Age: 30
End: 2020-06-11
Payer: MEDICAID

## 2020-06-11 DIAGNOSIS — R29.898 DECREASED STRENGTH OF LOWER EXTREMITY: ICD-10-CM

## 2020-06-11 DIAGNOSIS — M62.81 PROXIMAL MUSCLE WEAKNESS: ICD-10-CM

## 2020-06-11 DIAGNOSIS — M25.561 ANTERIOR KNEE PAIN, RIGHT: ICD-10-CM

## 2020-06-11 PROCEDURE — 97161 PT EVAL LOW COMPLEX 20 MIN: CPT

## 2020-06-11 PROCEDURE — 97535 SELF CARE MNGMENT TRAINING: CPT

## 2020-06-11 NOTE — PLAN OF CARE
[facility]  Physical Therapy Initial Evaluation    Name: Racquel Yepez  Clinic Number: [id number]    Therapy Diagnosis:   Encounter Diagnoses   Name Primary?    Anterior knee pain, right     Decreased strength of lower extremity     Proximal muscle weakness      Physician: Delfina Olvera,*    Physician Orders: PT Eval and Treat  Medical Diagnosis from Referral: R anterior knee pain  Evaluation Date: 6/11/2020  Authorization Period Expiration: 12/31/2020  Plan of Care Expiration: 9/9/2020  Visit # / Visits authorized: 1/12     Precautions: Standard    Time In: 9:45 am  Time Out: 10:30 am  Total Billable Time: 8 minutes    SUBJECTIVE   Date of onset: 6/4/2020  History of current condition - Racquel is a 29 y.o. female whom reports hx of chronic R knee pain for ~1 year. States symptoms started with a cracking sound in the knee; this was initially not painful but became painful over time. States she is very active and has played softball competitively since she was young; she is currently on a co-ed team; plays 3rd base. States the knee will really start to bother her on days when she is moving around a lot; however, she just pushes through the pain because she does not want to give up her extracurricular activities. She is currently in her co-ed softball season. States she was previously seeing a doctor [facility] her [facility] pain and that he had recommended knee injections or surgery, which she does not what to do yet. She recently started coming to [facility] where physical therapy was recommended. She occasionally wears a knee brace and states that it created an uncomfortable pressure on the knee and she does not feel it helps with the pain. Pt states she exercises regularly, mainly on the elliptical or walking on the treadmill but states this can become bothersome to the knee after prolonged period of time. States she does some light weights. Squats  and lunches are very bothersome. Feels that the R leg is overall weaker than the L.      Medical History:   Past Medical History:   Diagnosis Date    Abnormal Pap smear     colposcopy-- normal since    Anemia     Ectopic pregnancy        Surgical History:   Racquel Yepez  has a past surgical history that includes Cholecystectomy; Ectopic pregnancy surgery (3/3/2014); Abscess drainage (2016); gastric sleeve (2017); and  section, low transverse (2015).    Medications:   Racquel has a current medication list which includes the following prescription(s): etonogestrel, meloxicam, phentermine, and topiramate.    Allergies:   Review of patient's allergies indicates:  No Known Allergies     Imaging: x-rays on knee on     Prior Therapy: N/A  Social History: Pt lives with their family  Occupation: Pt is not working currently  Prior Level of Function: Independent and pain free with all ADL, IADL, community mobility and functional activities.   Current Level of Function: independent with all ADL, IADL, community mobility and functional activities with reports of increased pain and need for increased time and frequent breaks.      Pain:  Current 0/10, worst 8/10, best 0/10   Location: R knee, lateral to patella  Description: pain lingers (done for the day)  Aggravating Factors:  stairs, walking for prolonged period, squats, lunges, running  Easing Factors: rest, occasional tylenol     Dominant Extremity: Right    Pts goals: Pt reported goals are to decrease overall pain levels in order to return to maximal functional level. Would like to play softball pain free.     OBJECTIVE   (x = not tested due to pain and/or inability to obtain test position)    RANGE OF MOTION:    Knee ROM Right  2020 Left  2020 Pain/Dysfunction with Movement Goal   Knee Flexion (135) 135 135     Knee Extension (0) 0 0 Crunching sound noted with active extension of the R knee; pt reports mild discomfort       Full and pain-free range of motion of the hip and ankle       STRENGTH:    L/E MMT Right  6/11/2020 Left  6/11/2020 Pain/Dysfunction with Movement Goal   Hip Flexion  4-/5 4/5  5/5 B    Hip Extension  4-/5 4/5  5/5 B   Hip Abduction  4-/5 4/5  5/5 B   Knee Extension 4/5 5/5 Pain on R lateral knee with testing. Juddering in both legs with LAQ and eccentric lowering  5/5 B   Knee Flexion 4-/5 4-/5  5/5 B   Hip IR Not tested, pain 4-/5 4/10 pain in R lateral knee when testing position assumed  5/5 B   Hip ER Not tested, pain 4-/5 4/10 pain in R lateral knee when testing position assumed   5/5 B   Ankle DF 5/5 5/5 5/5 B   Ankle PF 5/5 8/20 R heel raises 5/5 20/20 L heel raises Gradual and increasing discomfort in R knee, just lateral to patella, with single leg heel raises on R  5/5 B    20/20 SL heel raises B   Ankle Inversion 4+/5 5/5  5/5 B   Ankle Eversion 4+/5 5/5  5/5 B       MUSCLE LENGTH:     Muscle Tested  Right  6/11/2020 Left   6/11/2020 Goal   Hamstrings  decreased decreased Normal B   Piriformis  normal normal Normal B   Gastrocnemius  normal normal Normal B   Soleus  normal normal Normal B     JOINT MOBILITY:     Joint Motion Tested Right  (spine)  6/11/2020 Left   6/11/2020 Goal   Medial patellar glide  Muscle guarding Hypermobile Normal B   Lateral patellar glide  Muscle guarding  Hypermobile Normal B   Superior patellar glide  Muscle guarding Normal Normal B   Inferior patellar glide  Muscle guarding Normal Normal B     SPECIAL TESTS:     Right  (spine)  6/11/2020 Left   6/11/2020 Goal   Valgus stress  Negative Negative Negative B    Varus stress  Positive Negative Negative B    Patellar compression  Positive Negative Negative B    thessaly Positive Negative Negative B    mansoor  Positive Negative Negative B        Palpation: Increased tenderness noted with palpation of right lateral patella, lateral tibiofemoral joint line, patellar tendon    Gait Analysis: The patient ambulated with the  "following assistive device: none; the pt presents with the following gait abnormalities: decreased stance time on R      FUNCTION:     CMS Impairment/Limitation/Restriction for FOTO knee Survey    Therapist reviewed FOTO scores for Racquel Yepez on 6/11/2020.   FOTO documents entered into [employer] - see Media section.    Limitation Score: 44%         TREATMENT   Treatment Time In: 10:22 am  Treatment Time Out: 10:30 am  Total Treatment time separate from Evaluation: 8 minutes    Education/Self-Care provided: (8 minutes)   Patient educated on the impairments noted above and the effects of physical therapy intervention to improve overall condition and QOL.       ASSESSMENT   Racquel is a 29 y.o. female referred to outpatient Physical Therapy with a medical diagnosis of R anterior knee pain. Pt presents with impairments including: decreased strength, decreased muscle length, gait abnormalities and decreased overall function.    Pt prognosis is Good.   Pt will benefit from skilled outpatient Physical Therapy to address the deficits stated above and in the chart below, provide pt/family education, and to maximize pt's level of independence.     Plan of care discussed with patient: Yes  Pt's spiritual, cultural and educational needs considered and patient is agreeable to the plan of care and goals as stated below:     Anticipated Barriers for therapy: chronicity of condition, lack of understanding of condition and adherence to treatment plan    Medical Necessity is demonstrated by the following  History  Co-morbidities and personal factors that may impact the plan of care Co-morbidities:   none    Personal Factors:   lifestyle     low   Examination  Body Structures and Functions, activity limitations and participation restrictions that may impact the plan of care Body Regions:   lower extremities    Body Systems:    strength  gait    Participation Restrictions:   See above in "Current Level of Function"     Activity " limitations:   Learning and applying knowledge  no deficits    General Tasks and Commands  no deficits    Communication  no deficits    Mobility  walking    Self care  toileting    Domestic Life  doing house work (cleaning house, washing dishes, laundry)    Interactions/Relationships  no deficits    Life Areas  no deficits    Community and Social Life  community life  recreation and leisure         low   Clinical Presentation stable and uncomplicated low   Decision Making/ Complexity Score: low       GOALS:    Short Term Goals:  6 weeks    1. Pain: Pt will demonstrate improved pain by reports of less than or equal to 5/10 worst pain on the verbal rating scale in order to progress toward maximal functional ability and improve QOL.    2. Function: Patient will demonstrate improved function as indicated by a functional status score of less than or equal to 35 out of 100 on FOTO.    3. Strength: Patient will improve strength to 50% of stated goals, listed in objective measures above, in order to progress towards independence with functional activities.     4. Gait: Patient will demonstrate improved gait mechanics including equal stance time B in order to improve functional mobility, improve quality of life, and decrease risk of further injury or fall.     5. HEP: Patient will demonstrate independence with HEP in order to progress toward functional independence.      Long Term Goals:  12 weeks    1. Pain: Pt will demonstrate improved pain by reports of less than or equal to 3/10 worst pain on the verbal rating scale in order to progress toward maximal functional ability and improve QOL.      2. Function: Patient will demonstrate improved function as indicated by a functional status score of less than or equal to 27 out of 100 on FOTO.    3. Strength: Patient will improve strength to stated goals, listed in objective measures above, in order to improve functional independence and quality of life.    4. Gait: Patient will  demonstrate normalized running mechanics with minimal compensation in order to return to PLOF.    5. Patient will return to normal ADL's, IADL's, community involvement, recreational activities, and work-related activities with less than or equal to 3/10 pain and maximal function.         PLAN   Plan of care Certification: 6/11/2020 to 9/9/2020.    Outpatient Physical Therapy 2 times weekly for 12 weeks to include any combination of the following interventions: dry needling, modalities, electrical stimulation (IFC, Pre-Mod, Attended with Functional Dry Needling), Cervical/Lumbar Traction, Gait Training, Manual Therapy, Neuromuscular Re-ed, Patient Education, Self Care, Therapeutic Activites and Therapeutic Exercise     Thank you for this referral.    These services are reasonable and necessary for the conditions set forth above while under my care.    Judy Kamara, PT, DPT

## 2020-06-18 ENCOUNTER — CLINICAL SUPPORT (OUTPATIENT)
Dept: REHABILITATION | Facility: HOSPITAL | Age: 30
End: 2020-06-18
Payer: MEDICAID

## 2020-06-18 DIAGNOSIS — M62.81 PROXIMAL MUSCLE WEAKNESS: ICD-10-CM

## 2020-06-18 DIAGNOSIS — R29.898 DECREASED STRENGTH OF LOWER EXTREMITY: ICD-10-CM

## 2020-06-18 PROCEDURE — 97140 MANUAL THERAPY 1/> REGIONS: CPT

## 2020-06-18 PROCEDURE — 97535 SELF CARE MNGMENT TRAINING: CPT

## 2020-06-18 PROCEDURE — 97110 THERAPEUTIC EXERCISES: CPT

## 2020-06-18 NOTE — PROGRESS NOTES
Physical Therapy Daily Treatment Note     Name: Racquel Oh Lucho  Clinic Number: [id number]    Therapy Diagnosis:   Encounter Diagnoses   Name Primary?    Decreased strength of lower extremity     Proximal muscle weakness      Physician: Delfina Olvera,*    Visit Date: 6/18/2020    Physician Orders: PT Eval and Treat  Medical Diagnosis from Referral: R anterior knee pain  Evaluation Date: 6/11/2020  Authorization Period Expiration: 12/31/2020  Plan of Care Expiration: 9/9/2020  Visit # / Visits authorized: 2/12      Precautions: Standard    Time In: 11:15 am  Time Out: 12:00 pm  Total Billable Time: 45 minutes    SUBJECTIVE   Date of onset: 6/4/2020  History of current condition - Racquel is a 29 y.o. female whom reports hx of chronic R knee pain for ~1 year. States symptoms started with a cracking sound in the knee; this was initially not painful but became painful over time. States she is very active and has played softball competitively since she was young; she is currently on a co-ed team; plays 3rd base. States the knee will really start to bother her on days when she is moving around a lot; however, she just pushes through the pain because she does not want to give up her extracurricular activities. She is currently in her co-ed softball season. States she was previously seeing a doctor at [facility] her [facility] and that he had recommended knee injections or surgery, which she does not what to do yet. She recently started coming to [facility] where physical therapy was recommended. She occasionally wears a knee brace and states that it created an uncomfortable pressure on the knee and she does not feel it helps with the pain. Pt states she exercises regularly, mainly on the elliptical or walking on the treadmill but states this can become bothersome to the knee after prolonged period of time. States she does some light weights. Squats and lunches are very bothersome. Feels  that the R leg is overall weaker than the L.     Pt reports: no significant changes since initial evaluation.  She N/A compliant with home exercise program.  Response to previous treatment: N/A  Functional change: None noted    Pre-Treatment Pain: 1/10  Post-Treatment Pain: 1/10  Location: R knee   TREATMENT     Racquel received therapeutic exercises to develop strength, endurance and core stabilization for 17 minutes including:    Exercise 6/18/2020   Upright bike (for LE strength and endurance)  Level 1, 5 minutes    birdges  3 x 10    SLR abduction  3 x 10 B    Shuttle squats  Level 6, 4 minutes    Squats 10x for form                    Racquel received the following manual therapy techniques: Myofacial release, Soft tissue Mobilization and Friction Massage were applied to the: R leg for 20 minutes, including:  STM of right vastus lateralis, lateral hamstring, peroneals  and tibialis anterior . Friction massage of R ITB   kinesiotape applied to the R knee to promote medial quadriceps activation       Home Exercises Provided and Patient Education Provided     Education/Self-Care provided: (8 minutes)   Patient educated on biomechanical justification for therapeutic exercise and importance of compliance with HEP in order to improve overall impairments and QOL    Patient educated on the importance of improved core and lower extremity strength in order to improve alignment of the spine and lower extremities with static positions and dynamic movement.    Patient educated on the importance of strong core and lower extremity musculature in order to improve both static and dynamic balance, improve gait mechanics and improve household and community mobility.     Written Home Exercises Provided: yes.  Exercises were reviewed and Racquel was able to demonstrate them prior to the end of the session.  Racquel demonstrated good  understanding of the education provided.     See EMR under Patient Instructions for exercises  provided 6/18/2020.    ASSESSMENT   Pt tolerated manual therapy well with reports of tenderness to palpation of R ITB and decreased tension in ITB and musculature following intervention. Pt tolerated exercise well with reports of increased fatigue but no increased pain. Pt initially demonstrates squat with excessive anterior translation of B knee and loud audible grinding of the R knee cap. Pt was instructed in proper squat form and was able to demonstrate good form with verbal and visual cueing and decreased grinding of patella.  Pt demonstrated good understanding of exercises and required minimal cueing to maintain proper form.    Racquel is progressing well towards her goals.   Pt prognosis is Good.     Pt will continue to benefit from skilled outpatient physical therapy to address the deficits listed in the problem list box on initial evaluation, provide pt/family education and to maximize pt's level of independence in the home and community environment.     Pt's spiritual, cultural and educational needs considered and pt agreeable to plan of care and goals.     Anticipated Barriers for therapy: chronicity of condition, lack of understanding of condition and adherence to treatment plan    GOALS:     Short Term Goals:  6 weeks     1. Pain: Pt will demonstrate improved pain by reports of less than or equal to 5/10 worst pain on the verbal rating scale in order to progress toward maximal functional ability and improve QOL.     2. Function: Patient will demonstrate improved function as indicated by a functional status score of less than or equal to 35 out of 100 on FOTO.     3. Strength: Patient will improve strength to 50% of stated goals, listed in objective measures above, in order to progress towards independence with functional activities.      4. Gait: Patient will demonstrate improved gait mechanics including equal stance time B in order to improve functional mobility, improve quality of life, and decrease  risk of further injury or fall.      5. HEP: Patient will demonstrate independence with HEP in order to progress toward functional independence.        Long Term Goals:  12 weeks     1. Pain: Pt will demonstrate improved pain by reports of less than or equal to 3/10 worst pain on the verbal rating scale in order to progress toward maximal functional ability and improve QOL.       2. Function: Patient will demonstrate improved function as indicated by a functional status score of less than or equal to 27 out of 100 on FOTO.     3. Strength: Patient will improve strength to stated goals, listed in objective measures above, in order to improve functional independence and quality of life.     4. Gait: Patient will demonstrate normalized running mechanics with minimal compensation in order to return to PLOF.     5. Patient will return to normal ADL's, IADL's, community involvement, recreational activities, and work-related activities with less than or equal to 3/10 pain and maximal function.           PLAN   Continue Plan of Care (POC) and progress per patient tolerance.    Evaluation:6/11/2020  POC Expiration: 9/9/2020    Judy Kamara PT, DPT

## 2020-06-19 ENCOUNTER — CLINICAL SUPPORT (OUTPATIENT)
Dept: REHABILITATION | Facility: HOSPITAL | Age: 30
End: 2020-06-19
Payer: MEDICAID

## 2020-06-19 DIAGNOSIS — M62.81 PROXIMAL MUSCLE WEAKNESS: ICD-10-CM

## 2020-06-19 DIAGNOSIS — R29.898 DECREASED STRENGTH OF LOWER EXTREMITY: ICD-10-CM

## 2020-06-19 PROCEDURE — 97110 THERAPEUTIC EXERCISES: CPT

## 2020-06-19 NOTE — PROGRESS NOTES
Physical Therapy Daily Treatment Note     Name: Racquel Oh Lucho  Clinic Number: [id number]    Therapy Diagnosis:   Encounter Diagnoses   Name Primary?    Decreased strength of lower extremity     Proximal muscle weakness      Physician: Delfina Olvera,*    Visit Date: 6/19/2020    Physician Orders: PT Eval and Treat  Medical Diagnosis from Referral: R anterior knee pain  Evaluation Date: 6/11/2020  Authorization Period Expiration: 12/31/2020  Plan of Care Expiration: 9/9/2020  Visit # / Visits authorized: 3/12      Precautions: Standard    Time In: 9:10 am  Time Out: 9:55 am  Total Billable Time: 45 minutes    SUBJECTIVE   Date of onset: 6/4/2020  History of current condition - Racquel is a 29 y.o. female whom reports hx of chronic R knee pain for ~1 year. States symptoms started with a cracking sound in the knee; this was initially not painful but became painful over time. States she is very active and has played softball competitively since she was young; she is currently on a co-ed team; plays 3rd base. States the knee will really start to bother her on days when she is moving around a lot; however, she just pushes through the pain because she does not want to give up her extracurricular activities. She is currently in her co-ed softball season. States she was previously seeing a doctor at [facility] her [facility] and that he had recommended knee injections or surgery, which she does not what to do yet. She recently started coming to [facility] where physical therapy was recommended. She occasionally wears a knee brace and states that it created an uncomfortable pressure on the knee and she does not feel it helps with the pain. Pt states she exercises regularly, mainly on the elliptical or walking on the treadmill but states this can become bothersome to the knee after prolonged period of time. States she does some light weights. Squats and lunches are very bothersome. Feels that  the R leg is overall weaker than the L.     Pt reports: she feels okay today. States she did a workout this morning already.   She was compliant with home exercise program.  Response to previous treatment: slight soreness in R lateral thigh following manual therapy   Functional change: None noted    Pre-Treatment Pain: 3/10  Post-Treatment Pain: 6/10  Location: R knee   TREATMENT     Racquel received therapeutic exercises to develop strength, endurance and core stabilization for 28 minutes including:    Exercise 6/19/2020   Upright bike (for LE strength and endurance)  Level 1, 5 minutes    birdge marches  4 x 5 reps    SLR abduction  3 x 10 B    Shuttle squats  Level 6, 4 minutes    7 way hip  10x for form    Leg press  Double leg: L6,    RDL  7.5# kettle bell   3 x 10              Racquel received the following manual therapy techniques: Myofacial release, Soft tissue Mobilization and Friction Massage were applied to the: R leg for 8 minutes, including:  STM of right vastus lateralis, lateral hamstring, peroneals  and tibialis anterior . Friction massage of R ITB       Racquel participated in neuromuscular re-education activities to improve: Balance and Proprioception for 9 minutes. The following activities were included:    Exercise 6/19/2020   SLS 1 minute B    SLS with overhead press  7.5# kettle bell   2 x 10 B    Steamboats  airex pad   Red band at ankles   10 x flexion, abduction and extension B                            x = exercise details same as prior session      Home Exercises Provided and Patient Education Provided     Education/Self-Care provided:    Patient educated on biomechanical justification for therapeutic exercise and importance of compliance with HEP in order to improve overall impairments and QOL    Patient educated on the importance of improved core and lower extremity strength in order to improve alignment of the spine and lower extremities with static positions and dynamic movement.     Patient educated on the importance of strong core and lower extremity musculature in order to improve both static and dynamic balance, improve gait mechanics and improve household and community mobility.     Written Home Exercises Provided: yes.  Exercises were reviewed and Racquel was able to demonstrate them prior to the end of the session.  Racquel demonstrated good  understanding of the education provided.     See EMR under Patient Instructions for exercises provided 6/18/2020.    ASSESSMENT   Pt tolerated manual therapy well with reports of tenderness to palpation of R ITB and decreased tension in ITB and musculature following intervention. Although patient had good form; she reports increased discomfort in R knee following exercises performed today; particularly with neuromuscular re-education interventions which I believe is due to having to stand on one leg. These interventions will be discontinued next session and we will work back up to these interventions when her symptoms have improved. Patient tolerated all therapeutic exercises well and required minimal cueing to maintain proper exercise form. No changes to HEP at this time.     Racquel is progressing well towards her goals.   Pt prognosis is Good.     Pt will continue to benefit from skilled outpatient physical therapy to address the deficits listed in the problem list box on initial evaluation, provide pt/family education and to maximize pt's level of independence in the home and community environment.     Pt's spiritual, cultural and educational needs considered and pt agreeable to plan of care and goals.     Anticipated Barriers for therapy: chronicity of condition, lack of understanding of condition and adherence to treatment plan    GOALS:     Short Term Goals:  6 weeks     1. Pain: Pt will demonstrate improved pain by reports of less than or equal to 5/10 worst pain on the verbal rating scale in order to progress toward maximal functional  ability and improve QOL.     2. Function: Patient will demonstrate improved function as indicated by a functional status score of less than or equal to 35 out of 100 on FOTO.     3. Strength: Patient will improve strength to 50% of stated goals, listed in objective measures above, in order to progress towards independence with functional activities.      4. Gait: Patient will demonstrate improved gait mechanics including equal stance time B in order to improve functional mobility, improve quality of life, and decrease risk of further injury or fall.      5. HEP: Patient will demonstrate independence with HEP in order to progress toward functional independence.        Long Term Goals:  12 weeks     1. Pain: Pt will demonstrate improved pain by reports of less than or equal to 3/10 worst pain on the verbal rating scale in order to progress toward maximal functional ability and improve QOL.       2. Function: Patient will demonstrate improved function as indicated by a functional status score of less than or equal to 27 out of 100 on FOTO.     3. Strength: Patient will improve strength to stated goals, listed in objective measures above, in order to improve functional independence and quality of life.     4. Gait: Patient will demonstrate normalized running mechanics with minimal compensation in order to return to PLOF.     5. Patient will return to normal ADL's, IADL's, community involvement, recreational activities, and work-related activities with less than or equal to 3/10 pain and maximal function.           PLAN   Continue Plan of Care (POC) and progress per patient tolerance. Discontinue standing balance exercises next session due to irritation of symptoms     Evaluation:6/11/2020  POC Expiration: 9/9/2020    Judy Kamara, PT, DPT

## 2020-06-23 ENCOUNTER — CLINICAL SUPPORT (OUTPATIENT)
Dept: REHABILITATION | Facility: HOSPITAL | Age: 30
End: 2020-06-23
Payer: MEDICAID

## 2020-06-23 DIAGNOSIS — R29.898 DECREASED STRENGTH OF LOWER EXTREMITY: ICD-10-CM

## 2020-06-23 DIAGNOSIS — M62.81 PROXIMAL MUSCLE WEAKNESS: ICD-10-CM

## 2020-06-23 PROCEDURE — 97140 MANUAL THERAPY 1/> REGIONS: CPT

## 2020-06-23 PROCEDURE — 97110 THERAPEUTIC EXERCISES: CPT

## 2020-06-23 NOTE — PROGRESS NOTES
Physical Therapy Daily Treatment Note     Name: Racquel Oh Lucho  Clinic Number: [id number]    Therapy Diagnosis:   Encounter Diagnoses   Name Primary?    Decreased strength of lower extremity     Proximal muscle weakness      Physician: Delfina Olvera,*    Visit Date: 6/23/2020    Physician Orders: PT Eval and Treat  Medical Diagnosis from Referral: R anterior knee pain  Evaluation Date: 6/11/2020  Authorization Period Expiration: 12/31/2020  Plan of Care Expiration: 9/9/2020  Visit # / Visits authorized: 4/12      Precautions: Standard    Time In: 10:58 am  Time Out: 11:30 am  Total Billable Time: 32 minutes    SUBJECTIVE   Date of onset: 6/4/2020  History of current condition - Racquel is a 29 y.o. female whom reports hx of chronic R knee pain for ~1 year. States symptoms started with a cracking sound in the knee; this was initially not painful but became painful over time. States she is very active and has played softball competitively since she was young; she is currently on a co-ed team; plays 3rd base. States the knee will really start to bother her on days when she is moving around a lot; however, she just pushes through the pain because she does not want to give up her extracurricular activities. She is currently in her co-ed softball season. States she was previously seeing a doctor at [facility] her knee pain and that he had recommended knee injections or surgery, which she does not what to do yet. She recently started coming to [facility] where physical therapy was recommended. She occasionally wears a knee brace and states that it created an uncomfortable pressure on the knee and she does not feel it helps with the pain. Pt states she exercises regularly, mainly on the elliptical or walking on the treadmill but states this can become bothersome to the knee after prolonged period of time. States she does some light weights. Squats and lunches are very bothersome. Feels  that the R leg is overall weaker than the L.     Pt reports: she is feeling okay today. States she just woke up and has not done any of her HEP yet but plans to do it later today.   She was compliant with home exercise program.  Response to previous treatment: increased pain for a few hours following previous session   Functional change: discontinue exercises performed in single leg stance due to increased pain     Pre-Treatment Pain: 3/10  Post-Treatment Pain: 6/10  Location: R knee   TREATMENT     Racquel received therapeutic exercises to develop strength, endurance and core stabilization for 19 minutes including:    Exercise 6/23/2020   Upright bike (for LE strength and endurance)     Single leg bridges with contralateral knee extended  2 x 8 B    SLR abduction     Shuttle squats     7 way hip     Leg press     RDL     LAQ with trunk extension  3 x 10 on R    SLR flexion  2 x 10 on R    SLR adduction  3 x 8 on R    Donkey kicks on exercise ball  2 x 10 B                Racquel received the following manual therapy techniques: Myofacial release, Soft tissue Mobilization and Friction Massage were applied to the: R leg for 8 minutes, including:  STM of right vastus lateralis, lateral hamstring, peroneals  and tibialis anterior . Friction massage of R ITB       Racquel participated in neuromuscular re-education activities to improve: Balance and Proprioception for 5 minutes. The following activities were included:    Exercise 6/23/2020   Side plank  Performed in modified position with downside knee flexed and top knee in extension   2 x 30 sec B    SLS with overhead press     Steamboats                             x = exercise details same as prior session      Home Exercises Provided and Patient Education Provided     Education/Self-Care provided:    Patient educated on biomechanical justification for therapeutic exercise and importance of compliance with HEP in order to improve overall impairments and QOL    Patient  educated on the importance of improved core and lower extremity strength in order to improve alignment of the spine and lower extremities with static positions and dynamic movement.    Patient educated on the importance of strong core and lower extremity musculature in order to improve both static and dynamic balance, improve gait mechanics and improve household and community mobility.     Written Home Exercises Provided: yes.  Exercises were reviewed and Racquel was able to demonstrate them prior to the end of the session.  Racquel demonstrated good  understanding of the education provided.     See EMR under Patient Instructions for exercises provided 6/18/2020.    ASSESSMENT   Pt tolerated manual therapy well with reports of tenderness to palpation of R ITB and decreased tension in ITB and musculature following intervention. Patient tolerated exercises well today with reports of being very fatigued, especially in quadriceps and glutes following her session. Patient initially reports discomfort in L knee when side planks performed with B knees in flexion; however, when top knee was extended patient states decreased pain. Patient was able to maintain good form with all exercises following initial demonstration and minimal cueing needed. Not all intended exercises were performed today due to patient arriving 13 minutes late for her appointment.      Racquel is progressing well towards her goals.   Pt prognosis is Good.     Pt will continue to benefit from skilled outpatient physical therapy to address the deficits listed in the problem list box on initial evaluation, provide pt/family education and to maximize pt's level of independence in the home and community environment.     Pt's spiritual, cultural and educational needs considered and pt agreeable to plan of care and goals.     Anticipated Barriers for therapy: chronicity of condition, lack of understanding of condition and adherence to treatment  plan    GOALS:     Short Term Goals:  6 weeks     1. Pain: Pt will demonstrate improved pain by reports of less than or equal to 5/10 worst pain on the verbal rating scale in order to progress toward maximal functional ability and improve QOL.     2. Function: Patient will demonstrate improved function as indicated by a functional status score of less than or equal to 35 out of 100 on FOTO.     3. Strength: Patient will improve strength to 50% of stated goals, listed in objective measures above, in order to progress towards independence with functional activities.      4. Gait: Patient will demonstrate improved gait mechanics including equal stance time B in order to improve functional mobility, improve quality of life, and decrease risk of further injury or fall.      5. HEP: Patient will demonstrate independence with HEP in order to progress toward functional independence.        Long Term Goals:  12 weeks     1. Pain: Pt will demonstrate improved pain by reports of less than or equal to 3/10 worst pain on the verbal rating scale in order to progress toward maximal functional ability and improve QOL.       2. Function: Patient will demonstrate improved function as indicated by a functional status score of less than or equal to 27 out of 100 on FOTO.     3. Strength: Patient will improve strength to stated goals, listed in objective measures above, in order to improve functional independence and quality of life.     4. Gait: Patient will demonstrate normalized running mechanics with minimal compensation in order to return to PLOF.     5. Patient will return to normal ADL's, IADL's, community involvement, recreational activities, and work-related activities with less than or equal to 3/10 pain and maximal function.           PLAN   Continue Plan of Care (POC) and progress per patient tolerance.     Evaluation: 6/11/2020  POC Expiration: 9/9/2020    Judy Kamara, PT, DPT

## 2020-06-25 ENCOUNTER — CLINICAL SUPPORT (OUTPATIENT)
Dept: REHABILITATION | Facility: HOSPITAL | Age: 30
End: 2020-06-25
Payer: MEDICAID

## 2020-06-25 DIAGNOSIS — R29.898 DECREASED STRENGTH OF LOWER EXTREMITY: ICD-10-CM

## 2020-06-25 DIAGNOSIS — M62.81 PROXIMAL MUSCLE WEAKNESS: ICD-10-CM

## 2020-06-25 PROCEDURE — 97112 NEUROMUSCULAR REEDUCATION: CPT

## 2020-06-25 PROCEDURE — 97110 THERAPEUTIC EXERCISES: CPT

## 2020-06-25 PROCEDURE — 97140 MANUAL THERAPY 1/> REGIONS: CPT

## 2020-06-25 NOTE — PROGRESS NOTES
Physical Therapy Daily Treatment Note     Name: Racquel Yepez  Clinic Number: [id number]    Therapy Diagnosis:   Encounter Diagnoses   Name Primary?    Decreased strength of lower extremity     Proximal muscle weakness      Physician: Delfina Olvera,*    Visit Date: 6/25/2020    Physician Orders: PT Eval and Treat  Medical Diagnosis from Referral: R anterior knee pain  Evaluation Date: 6/11/2020  Authorization Period Expiration: 12/31/2020  Plan of Care Expiration: 9/9/2020  Visit # / Visits authorized: 5/12      Precautions: Standard    Time In: 7:30 am  Time Out: 8:20 am  Total Billable Time: 50 minutes    SUBJECTIVE   Date of onset: 6/4/2020  History of current condition - Racquel is a 29 y.o. female whom reports hx of chronic R knee pain for ~1 year. States symptoms started with a cracking sound in the knee; this was initially not painful but became painful over time. States she is very active and has played softball competitively since she was young; she is currently on a co-ed team; plays 3rd base. States the knee will really start to bother her on days when she is moving around a lot; however, she just pushes through the pain because she does not want to give up her extracurricular activities. She is currently in her co-ed softball season. States she was previously seeing a doctor at [facility] her knee pain and that he had recommended knee injections or surgery, which she does not what to do yet. She recently started coming to [facility] where physical therapy was recommended. She occasionally wears a knee brace and states that it created an uncomfortable pressure on the knee and she does not feel it helps with the pain. Pt states she exercises regularly, mainly on the elliptical or walking on the treadmill but states this can become bothersome to the knee after prolonged period of time. States she does some light weights. Squats and lunches are very bothersome. Feels that  the R leg is overall weaker than the L.     Pt reports: she is feeling okay today; states she just woke up and has not done much yet. No significant changes in overall knee pain thus far   She was compliant with home exercise program.  Response to previous treatment: increased pain for a few hours following previous session   Functional change: none noted     Pre-Treatment Pain: 0/10  Post-Treatment Pain: 5/10  Location: R knee   TREATMENT     Racquel received therapeutic exercises to develop strength, endurance and core stabilization for 30 minutes including:    Exercise 6/25/2020   Upright bike (for LE strength and endurance)  Level 8, 5 minutes    Single leg bridges with contralateral knee extended  3 x 8 B    SLR abduction     Shuttle squats     7 way hip     Leg press     RDL  10#, 2 x 15   LAQ with trunk extension     SLR flexion  2 x 12 on R    SLR adduction     Donkey kicks on exercise ball     Calf raises  2 x 12 with tennis ball between heels in order to maintain neutral ankle position            Racquel received the following manual therapy techniques: Myofacial release, Soft tissue Mobilization and Friction Massage were applied to the: R leg for 12 minutes, including:  STM of right vastus lateralis, lateral hamstring, peroneals  and tibialis anterior . Friction massage of R ITB   Grade II medial glide of R patella. Medial tilting of R patella.      Racquel participated in neuromuscular re-education activities to improve: Balance and Proprioception for 8 minutes. The following activities were included:    Exercise 6/25/2020   Side plank  Performed in modified position with B knees extended and upper body propped on HiLo mat   2 x 30 sec B    SLS with overhead press     Steamboats     Prone TKE  3 x 10 on R                        x = exercise details same as prior session      Home Exercises Provided and Patient Education Provided     Education/Self-Care provided:    Patient educated on biomechanical  justification for therapeutic exercise and importance of compliance with HEP in order to improve overall impairments and QOL    Patient educated on the importance of improved core and lower extremity strength in order to improve alignment of the spine and lower extremities with static positions and dynamic movement.    Patient educated on the importance of strong core and lower extremity musculature in order to improve both static and dynamic balance, improve gait mechanics and improve household and community mobility.     Written Home Exercises Provided: yes.  Exercises were reviewed and Racquel was able to demonstrate them prior to the end of the session.  Racquel demonstrated good  understanding of the education provided.     See EMR under Patient Instructions for exercises provided 6/18/2020.    ASSESSMENT   Pt tolerated manual therapy well with reports of tenderness to palpation of R ITB and decreased tension in ITB and musculature following intervention. Patient tolerated exercises well today with reports of being very fatigued, especially in quadriceps following her session. All exercises created some pain in R lateral knee and thus patients overall pain had increased to 5/10 at the end of her session. Patient declined ice to the knee. Patient was able to maintain good form with all exercises following initial demonstration and minimal cueing needed.     Racquel is progressing well towards her goals.   Pt prognosis is Good.     Pt will continue to benefit from skilled outpatient physical therapy to address the deficits listed in the problem list box on initial evaluation, provide pt/family education and to maximize pt's level of independence in the home and community environment.     Pt's spiritual, cultural and educational needs considered and pt agreeable to plan of care and goals.     Anticipated Barriers for therapy: chronicity of condition, lack of understanding of condition and adherence to treatment  plan    GOALS:     Short Term Goals:  6 weeks     1. Pain: Pt will demonstrate improved pain by reports of less than or equal to 5/10 worst pain on the verbal rating scale in order to progress toward maximal functional ability and improve QOL.     2. Function: Patient will demonstrate improved function as indicated by a functional status score of less than or equal to 35 out of 100 on FOTO.     3. Strength: Patient will improve strength to 50% of stated goals, listed in objective measures above, in order to progress towards independence with functional activities.      4. Gait: Patient will demonstrate improved gait mechanics including equal stance time B in order to improve functional mobility, improve quality of life, and decrease risk of further injury or fall.      5. HEP: Patient will demonstrate independence with HEP in order to progress toward functional independence.        Long Term Goals:  12 weeks     1. Pain: Pt will demonstrate improved pain by reports of less than or equal to 3/10 worst pain on the verbal rating scale in order to progress toward maximal functional ability and improve QOL.       2. Function: Patient will demonstrate improved function as indicated by a functional status score of less than or equal to 27 out of 100 on FOTO.     3. Strength: Patient will improve strength to stated goals, listed in objective measures above, in order to improve functional independence and quality of life.     4. Gait: Patient will demonstrate normalized running mechanics with minimal compensation in order to return to PLOF.     5. Patient will return to normal ADL's, IADL's, community involvement, recreational activities, and work-related activities with less than or equal to 3/10 pain and maximal function.           PLAN   Continue Plan of Care (POC) and progress per patient tolerance.     Evaluation: 6/11/2020  POC Expiration: 9/9/2020    Judy Kamara, PT, DPT

## 2020-06-29 ENCOUNTER — CLINICAL SUPPORT (OUTPATIENT)
Dept: REHABILITATION | Facility: HOSPITAL | Age: 30
End: 2020-06-29
Payer: MEDICAID

## 2020-06-29 DIAGNOSIS — R29.898 DECREASED STRENGTH OF LOWER EXTREMITY: ICD-10-CM

## 2020-06-29 DIAGNOSIS — M62.81 PROXIMAL MUSCLE WEAKNESS: ICD-10-CM

## 2020-06-29 PROCEDURE — 97140 MANUAL THERAPY 1/> REGIONS: CPT

## 2020-06-29 PROCEDURE — 97110 THERAPEUTIC EXERCISES: CPT

## 2020-06-29 NOTE — PROGRESS NOTES
Physical Therapy Daily Treatment Note     Name: Racquel Oh Lucho  Clinic Number: [id number]    Therapy Diagnosis:   Encounter Diagnoses   Name Primary?    Decreased strength of lower extremity     Proximal muscle weakness      Physician: Delfina Olvera,*    Visit Date: 6/29/2020    Physician Orders: PT Eval and Treat  Medical Diagnosis from Referral: R anterior knee pain  Evaluation Date: 6/11/2020  Authorization Period Expiration: 12/31/2020  Plan of Care Expiration: 9/9/2020  Visit # / Visits authorized: 6/12      Precautions: Standard    Time In: 11:15 am  Time Out: 12:00 pm  Total Billable Time: 45 minutes    SUBJECTIVE   Date of onset: 6/4/2020  History of current condition - Racquel is a 29 y.o. female whom reports hx of chronic R knee pain for ~1 year. States symptoms started with a cracking sound in the knee; this was initially not painful but became painful over time. States she is very active and has played softball competitively since she was young; she is currently on a co-ed team; plays 3rd base. States the knee will really start to bother her on days when she is moving around a lot; however, she just pushes through the pain because she does not want to give up her extracurricular activities. She is currently in her co-ed softball season. States she was previously seeing a doctor at [facility] her knee pain and that he had recommended knee injections or surgery, which she does not what to do yet. She recently started coming to [facility] where physical therapy was recommended. She occasionally wears a knee brace and states that it created an uncomfortable pressure on the knee and she does not feel it helps with the pain. Pt states she exercises regularly, mainly on the elliptical or walking on the treadmill but states this can become bothersome to the knee after prolonged period of time. States she does some light weights. Squats and lunches are very bothersome. Feels  that the R leg is overall weaker than the L.     Pt reports: she is feeling okay today; states no pain today or over the weekend. Softball was cancelled over the weekend and she just relaxed over the weekend.    She was compliant with home exercise program.  Response to previous treatment: sore to touch at R ITB. Fatigue  Functional change: progression of exercises tolerated well      Pre-Treatment Pain: 0/10  Post-Treatment Pain: 3/10  Location: R knee   TREATMENT     Racquel received therapeutic exercises to develop strength, endurance and core stabilization for 25 minutes including:    Exercise 6/29/2020   Upright bike (for LE strength and endurance)  Level 8, 5 minutes    Single leg bridges with contralateral knee extended     SLR abduction     Shuttle squats     7 way hip  2 x 5 in each direction   Performed B    Leg press     RDL  15#, 3 x 12   LAQ with trunk extension     SLR flexion  3 x 10 on R    SLR adduction     Donkey kicks on exercise ball     Calf raises     IT band stretch  2 x 1 minute on R        Racquel received the following manual therapy techniques: Myofacial release, Soft tissue Mobilization and Friction Massage were applied to the: R leg for 15 minutes, including:  STM of right vastus lateralis, lateral hamstring, peroneals  and tibialis anterior . Friction massage of R ITB   Grade II medial glide of R patella. Medial tilting of R patella.      Racquel participated in neuromuscular re-education activities to improve: Balance and Proprioception for 5 minutes. The following activities were included:    Exercise 6/29/2020   Side plank hip dips  Performed in modified position with B knees extended and upper body propped on HiLo mat   2 x 10 B    SLS with overhead press     Steamboats     Prone TKE                         x = exercise details same as prior session      Home Exercises Provided and Patient Education Provided     Education/Self-Care provided:    Patient educated on biomechanical  justification for therapeutic exercise and importance of compliance with HEP in order to improve overall impairments and QOL    Patient educated on the importance of improved core and lower extremity strength in order to improve alignment of the spine and lower extremities with static positions and dynamic movement.    Patient educated on the importance of strong core and lower extremity musculature in order to improve both static and dynamic balance, improve gait mechanics and improve household and community mobility.     Written Home Exercises Provided: yes.  Exercises were reviewed and Racquel was able to demonstrate them prior to the end of the session.  Racquel demonstrated good  understanding of the education provided.     See EMR under Patient Instructions for exercises provided 6/18/2020.    ASSESSMENT   Pt tolerated manual therapy well with continued reports of tenderness to palpation of R ITB and decreased tension in ITB and musculature following intervention. Patient reports decreased tenderness with palpation of R lateral patella and medial glides of patella compared to previous visits. Patient tolerated exercises well today with reports of moderate fatigue following exercises. Mild but tolerable irritation in R lateral knee noted following exercises performed today. Patient was able to maintain good form with all exercises following initial demonstration and minimal cueing needed.     Racquel is progressing well towards her goals.   Pt prognosis is Good.     Pt will continue to benefit from skilled outpatient physical therapy to address the deficits listed in the problem list box on initial evaluation, provide pt/family education and to maximize pt's level of independence in the home and community environment.     Pt's spiritual, cultural and educational needs considered and pt agreeable to plan of care and goals.     Anticipated Barriers for therapy: chronicity of condition, lack of understanding of  condition and adherence to treatment plan    GOALS:     Short Term Goals:  6 weeks     1. Pain: Pt will demonstrate improved pain by reports of less than or equal to 5/10 worst pain on the verbal rating scale in order to progress toward maximal functional ability and improve QOL.     2. Function: Patient will demonstrate improved function as indicated by a functional status score of less than or equal to 35 out of 100 on FOTO.     3. Strength: Patient will improve strength to 50% of stated goals, listed in objective measures above, in order to progress towards independence with functional activities.      4. Gait: Patient will demonstrate improved gait mechanics including equal stance time B in order to improve functional mobility, improve quality of life, and decrease risk of further injury or fall.      5. HEP: Patient will demonstrate independence with HEP in order to progress toward functional independence.        Long Term Goals:  12 weeks     1. Pain: Pt will demonstrate improved pain by reports of less than or equal to 3/10 worst pain on the verbal rating scale in order to progress toward maximal functional ability and improve QOL.       2. Function: Patient will demonstrate improved function as indicated by a functional status score of less than or equal to 27 out of 100 on FOTO.     3. Strength: Patient will improve strength to stated goals, listed in objective measures above, in order to improve functional independence and quality of life.     4. Gait: Patient will demonstrate normalized running mechanics with minimal compensation in order to return to PLOF.     5. Patient will return to normal ADL's, IADL's, community involvement, recreational activities, and work-related activities with less than or equal to 3/10 pain and maximal function.           PLAN   Continue Plan of Care (POC) and progress per patient tolerance.     Evaluation: 6/11/2020  POC Expiration: 9/9/2020    Judy Kamara, PT, DPT

## 2020-07-01 ENCOUNTER — CLINICAL SUPPORT (OUTPATIENT)
Dept: REHABILITATION | Facility: HOSPITAL | Age: 30
End: 2020-07-01
Payer: MEDICAID

## 2020-07-01 DIAGNOSIS — R29.898 DECREASED STRENGTH OF LOWER EXTREMITY: ICD-10-CM

## 2020-07-01 DIAGNOSIS — M62.81 PROXIMAL MUSCLE WEAKNESS: ICD-10-CM

## 2020-07-01 PROCEDURE — 97140 MANUAL THERAPY 1/> REGIONS: CPT

## 2020-07-01 PROCEDURE — 97110 THERAPEUTIC EXERCISES: CPT

## 2020-07-01 NOTE — PROGRESS NOTES
Physical Therapy Daily Treatment Note     Name: Racquel Oh Lucho  Clinic Number: [id number]    Therapy Diagnosis:   Encounter Diagnoses   Name Primary?    Decreased strength of lower extremity     Proximal muscle weakness      Physician: Delfina Olvera,*    Visit Date: 7/1/2020    Physician Orders: PT Eval and Treat  Medical Diagnosis from Referral: R anterior knee pain  Evaluation Date: 6/11/2020  Authorization Period Expiration: 12/31/2020  Plan of Care Expiration: 9/9/2020  Visit # / Visits authorized: 7/12      Precautions: Standard    Time In 2:45 pm  Time Out: 3:30 pm  Total Billable Time: 45 minutes    SUBJECTIVE   Date of onset: 6/4/2020  History of current condition - Racquel is a 29 y.o. female whom reports hx of chronic R knee pain for ~1 year. States symptoms started with a cracking sound in the knee; this was initially not painful but became painful over time. States she is very active and has played softball competitively since she was young; she is currently on a co-ed team; plays 3rd base. States the knee will really start to bother her on days when she is moving around a lot; however, she just pushes through the pain because she does not want to give up her extracurricular activities. She is currently in her co-ed softball season. States she was previously seeing a doctor at [facility] her knee pain and that he had recommended knee injections or surgery, which she does not what to do yet. She recently started coming to [facility] where physical therapy was recommended. She occasionally wears a knee brace and states that it created an uncomfortable pressure on the knee and she does not feel it helps with the pain. Pt states she exercises regularly, mainly on the elliptical or walking on the treadmill but states this can become bothersome to the knee after prolonged period of time. States she does some light weights. Squats and lunches are very bothersome. Feels that  the R leg is overall weaker than the L.     Pt reports: she is feeling okay today; she has been working at home all day and thus hasn't had much activity.   She was compliant with home exercise program.  Response to previous treatment: increased knee pain. Sore to touch at R ITB. Fatigue.  Functional change: progression of exercises tolerated well      Pre-Treatment Pain: 0/10  Post-Treatment Pain: 6/10  Location: R knee   TREATMENT     Racquel received therapeutic exercises to develop strength, endurance and core stabilization for 28 minutes including:    Exercise 7/1/2020   Upright bike (for LE strength and endurance)  Level 8, 5 minutes    Bridges with feet propped on exercise ball and knees extended  3 x 12    SLR abduction     Shuttle squats     7 way hip     Leg press  Level 6, 3 x 12    RDL     LAQ with trunk extension     SLR flexion  2#, 3 x 8 on R    SLR adduction     Donkey kicks on exercise ball     Calf raises     IT band stretch  2 x 1 minute on R    SLR abduction with external rotation and extension 2#, 3 x 10 on R    Ball Mini Squats  2 x 10        Racquel received the following manual therapy techniques: Myofacial release, Soft tissue Mobilization and Friction Massage were applied to the: R leg for 15 minutes, including:  STM of right vastus lateralis and lateral hamstring. Friction massage of R ITB   Grade II medial glide of R patella. Medial tilting of R patella.      Racquel participated in neuromuscular re-education activities to improve: Balance and Proprioception for 2 minutes. The following activities were included:    Exercise 7/1/2020   Side plank hip dips     SLS with overhead press     Steamboats     Standing TKE  Attempted, d/c due to pain                        x = exercise details same as prior session      Home Exercises Provided and Patient Education Provided     Education/Self-Care provided:    Patient educated on biomechanical justification for therapeutic exercise and importance of  compliance with HEP in order to improve overall impairments and QOL    Patient educated on the importance of improved core and lower extremity strength in order to improve alignment of the spine and lower extremities with static positions and dynamic movement.    Patient educated on the importance of strong core and lower extremity musculature in order to improve both static and dynamic balance, improve gait mechanics and improve household and community mobility.     Written Home Exercises Provided: yes.  Exercises were reviewed and Racquel was able to demonstrate them prior to the end of the session.  Racquel demonstrated good  understanding of the education provided.     See EMR under Patient Instructions for exercises provided 6/18/2020.    ASSESSMENT   Pt tolerated manual therapy well with continued reports of tenderness to palpation of R ITB and soreness following intervention. Improved medial patellar glide noted as well as decreased sensitivity during intervention reported by patient. Patient reports increased pain in R lateral knee following interventions performed today. Pain slowly increases with each intervention performed. Standing terminal knee extensions attempted but discontenting due to pain.  Patient was able to maintain good form with all exercises following initial demonstration and minimal cueing needed.     Racquel is progressing well towards her goals.   Pt prognosis is Good.     Pt will continue to benefit from skilled outpatient physical therapy to address the deficits listed in the problem list box on initial evaluation, provide pt/family education and to maximize pt's level of independence in the home and community environment.     Pt's spiritual, cultural and educational needs considered and pt agreeable to plan of care and goals.     Anticipated Barriers for therapy: chronicity of condition, lack of understanding of condition and adherence to treatment plan    GOALS:     Short Term Goals:   6 weeks     1. Pain: Pt will demonstrate improved pain by reports of less than or equal to 5/10 worst pain on the verbal rating scale in order to progress toward maximal functional ability and improve QOL.     2. Function: Patient will demonstrate improved function as indicated by a functional status score of less than or equal to 35 out of 100 on FOTO.     3. Strength: Patient will improve strength to 50% of stated goals, listed in objective measures above, in order to progress towards independence with functional activities.      4. Gait: Patient will demonstrate improved gait mechanics including equal stance time B in order to improve functional mobility, improve quality of life, and decrease risk of further injury or fall.      5. HEP: Patient will demonstrate independence with HEP in order to progress toward functional independence.        Long Term Goals:  12 weeks     1. Pain: Pt will demonstrate improved pain by reports of less than or equal to 3/10 worst pain on the verbal rating scale in order to progress toward maximal functional ability and improve QOL.       2. Function: Patient will demonstrate improved function as indicated by a functional status score of less than or equal to 27 out of 100 on FOTO.     3. Strength: Patient will improve strength to stated goals, listed in objective measures above, in order to improve functional independence and quality of life.     4. Gait: Patient will demonstrate normalized running mechanics with minimal compensation in order to return to PLOF.     5. Patient will return to normal ADL's, IADL's, community involvement, recreational activities, and work-related activities with less than or equal to 3/10 pain and maximal function.           PLAN   Continue Plan of Care (POC) and progress per patient tolerance.     Evaluation: 6/11/2020  POC Expiration: 9/9/2020    Judy Kamara, PT, DPT

## 2020-07-13 ENCOUNTER — CLINICAL SUPPORT (OUTPATIENT)
Dept: REHABILITATION | Facility: HOSPITAL | Age: 30
End: 2020-07-13
Payer: MEDICAID

## 2020-07-13 DIAGNOSIS — R29.898 DECREASED STRENGTH OF LOWER EXTREMITY: ICD-10-CM

## 2020-07-13 DIAGNOSIS — M62.81 PROXIMAL MUSCLE WEAKNESS: ICD-10-CM

## 2020-07-13 PROCEDURE — 97140 MANUAL THERAPY 1/> REGIONS: CPT

## 2020-07-13 PROCEDURE — 97110 THERAPEUTIC EXERCISES: CPT

## 2020-07-16 ENCOUNTER — CLINICAL SUPPORT (OUTPATIENT)
Dept: REHABILITATION | Facility: HOSPITAL | Age: 30
End: 2020-07-16
Payer: MEDICAID

## 2020-07-16 DIAGNOSIS — M62.81 PROXIMAL MUSCLE WEAKNESS: ICD-10-CM

## 2020-07-16 DIAGNOSIS — R29.898 DECREASED STRENGTH OF LOWER EXTREMITY: ICD-10-CM

## 2020-07-16 PROCEDURE — 97140 MANUAL THERAPY 1/> REGIONS: CPT

## 2020-07-16 PROCEDURE — 97110 THERAPEUTIC EXERCISES: CPT

## 2020-07-16 PROCEDURE — 97112 NEUROMUSCULAR REEDUCATION: CPT

## 2020-07-16 NOTE — PROGRESS NOTES
Physical Therapy Daily Treatment Note     Name: Racquel Oh Lucho  Clinic Number: [id number]    Therapy Diagnosis:   Encounter Diagnoses   Name Primary?    Decreased strength of lower extremity     Proximal muscle weakness      Physician: Delfina Olvera,*    Visit Date: 7/13/2020    Physician Orders: PT Eval and Treat  Medical Diagnosis from Referral: R anterior knee pain  Evaluation Date: 6/11/2020  Authorization Period Expiration: 12/31/2020  Plan of Care Expiration: 9/9/2020  Visit # / Visits authorized: 8/12      Precautions: Standard    Time In 11:15 am  Time Out: 12:00 pm  Total Billable Time: 45 minutes    SUBJECTIVE   Date of onset: 6/4/2020  History of current condition - Racquel is a 29 y.o. female whom reports hx of chronic R knee pain for ~1 year. States symptoms started with a cracking sound in the knee; this was initially not painful but became painful over time. States she is very active and has played softball competitively since she was young; she is currently on a co-ed team; plays 3rd base. States the knee will really start to bother her on days when she is moving around a lot; however, she just pushes through the pain because she does not want to give up her extracurricular activities. She is currently in her co-ed softball season. States she was previously seeing a doctor [facility] her [facility] pain and that he had recommended knee injections or surgery, which she does not what to do yet. She recently started coming to [facility] where physical therapy was recommended. She occasionally wears a knee brace and states that it created an uncomfortable pressure on the knee and she does not feel it helps with the pain. Pt states she exercises regularly, mainly on the elliptical or walking on the treadmill but states this can become bothersome to the knee after prolonged period of time. States she does some light weights. Squats and lunches are very bothersome. Feels  that the R leg is overall weaker than the L.     Pt reports: no significant changes since previous session    She was compliant with home exercise program.  Response to previous treatment: soreness following manual therapy performed. Increased R lateral knee pain   Functional change: progression of exercises tolerated well      Pre-Treatment Pain: 0/10  Post-Treatment Pain: 4/10  Location: R knee   TREATMENT     Racquel received therapeutic exercises to develop strength, endurance and core stabilization for 25 minutes including:    Exercise 7/13/2020   Upright bike (for LE strength and endurance)  Level 8, 5 minutes    Bridges with feet propped on exercise ball and knees extended  3 x 12    SLR abduction     Shuttle squats     7 way hip  2 x 7 on R    Leg press  Level 6, 3 x 12    RDL  15#, 3 x 10    LAQ with trunk extension     SLR flexion  3#, 3 x 8 on R    SLR adduction  3#, 3 x 10    Donkey kicks on exercise ball     Calf raises     IT band stretch  2 x 1 minute on R following manual therapy   SLR abduction with external rotation and extension 3#, 3 x 10 on R    Ball Mini Squats         Racquel received the following manual therapy techniques: Myofacial release, Soft tissue Mobilization and Friction Massage were applied to the: R leg for 15 minutes, including:  STM of right piriformis, vastus lateralis, lateral hamstring and TFL. Friction massage of R ITB   Grade III medial glide of R patella. Medial tilting of R patella.      Racquel participated in neuromuscular re-education activities to improve: Balance and Proprioception for 5 minutes. The following activities were included:    Exercise 7/13/2020   Side plank hip dips     SLS with overhead press     Steamboats     Standing TKE     Mobo Board  Contralateral LE supported on 2 inch step   40 reps in each direction on R                    x = exercise details same as prior session      Home Exercises Provided and Patient Education Provided     Education/Self-Care  "provided:    Patient educated on biomechanical justification for therapeutic exercise and importance of compliance with HEP in order to improve overall impairments and QOL    Patient educated on the importance of improved core and lower extremity strength in order to improve alignment of the spine and lower extremities with static positions and dynamic movement.    Patient educated on the importance of strong core and lower extremity musculature in order to improve both static and dynamic balance, improve gait mechanics and improve household and community mobility.     Written Home Exercises Provided: yes.  Exercises were reviewed and Racquel was able to demonstrate them prior to the end of the session.  Racquel demonstrated good  understanding of the education provided.     See EMR under Patient Instructions for exercises provided 6/18/2020.    ASSESSMENT   Pt tolerated manual therapy well with continued reports of tenderness to palpation of R ITB and soreness following intervention. Increased tone and reports of tenderness noted at R tensor fascia enrico and piriformis; manual therapy tolerated well. Patient tolerated all exercises well with reports of tolerable increase in pain at R lateral knee during intervention. Patient is able to "push through" exercises; tolerates exercises on mat better than standing interventions. Patient was able to maintain good form with all exercises following initial demonstration and minimal cueing needed.     Racquel is progressing well towards her goals.   Pt prognosis is Good.     Pt will continue to benefit from skilled outpatient physical therapy to address the deficits listed in the problem list box on initial evaluation, provide pt/family education and to maximize pt's level of independence in the home and community environment.     Pt's spiritual, cultural and educational needs considered and pt agreeable to plan of care and goals.     Anticipated Barriers for therapy: " chronicity of condition, lack of understanding of condition and adherence to treatment plan    GOALS:     Short Term Goals:  6 weeks     1. Pain: Pt will demonstrate improved pain by reports of less than or equal to 5/10 worst pain on the verbal rating scale in order to progress toward maximal functional ability and improve QOL.     2. Function: Patient will demonstrate improved function as indicated by a functional status score of less than or equal to 35 out of 100 on FOTO.     3. Strength: Patient will improve strength to 50% of stated goals, listed in objective measures above, in order to progress towards independence with functional activities.      4. Gait: Patient will demonstrate improved gait mechanics including equal stance time B in order to improve functional mobility, improve quality of life, and decrease risk of further injury or fall.      5. HEP: Patient will demonstrate independence with HEP in order to progress toward functional independence.        Long Term Goals:  12 weeks     1. Pain: Pt will demonstrate improved pain by reports of less than or equal to 3/10 worst pain on the verbal rating scale in order to progress toward maximal functional ability and improve QOL.       2. Function: Patient will demonstrate improved function as indicated by a functional status score of less than or equal to 27 out of 100 on FOTO.     3. Strength: Patient will improve strength to stated goals, listed in objective measures above, in order to improve functional independence and quality of life.     4. Gait: Patient will demonstrate normalized running mechanics with minimal compensation in order to return to PLOF.     5. Patient will return to normal ADL's, IADL's, community involvement, recreational activities, and work-related activities with less than or equal to 3/10 pain and maximal function.           PLAN   Continue Plan of Care (POC) and progress per patient tolerance.     Evaluation: 6/11/2020  POC  Expiration: 9/9/2020    Judy Kamara PT, DPT

## 2020-07-20 NOTE — PROGRESS NOTES
Physical Therapy Daily Treatment Note     Name: Racquel Oh Lucho  Clinic Number: [id number]    Therapy Diagnosis:   Encounter Diagnoses   Name Primary?    Decreased strength of lower extremity     Proximal muscle weakness      Physician: Delfina Olvera,*    Visit Date: 7/16/2020    Physician Orders: PT Eval and Treat  Medical Diagnosis from Referral: R anterior knee pain  Evaluation Date: 6/11/2020  Authorization Period Expiration: 12/31/2020  Plan of Care Expiration: 9/9/2020  Visit # / Visits authorized: 9/12      Precautions: Standard    Time In 11:15 am  Time Out: 12:00 pm  Total Billable Time: 45 minutes    SUBJECTIVE   Date of onset: 6/4/2020  History of current condition - Racquel is a 29 y.o. female whom reports hx of chronic R knee pain for ~1 year. States symptoms started with a cracking sound in the knee; this was initially not painful but became painful over time. States she is very active and has played softball competitively since she was young; she is currently on a co-ed team; plays 3rd base. States the knee will really start to bother her on days when she is moving around a lot; however, she just pushes through the pain because she does not want to give up her extracurricular activities. She is currently in her co-ed softball season. States she was previously seeing a doctor [facility] her [facility] and that he had recommended knee injections or surgery, which she does not what to do yet. She recently started coming to [facility] where physical therapy was recommended. She occasionally wears a knee brace and states that it created an uncomfortable pressure on the knee and she does not feel it helps with the pain. Pt states she exercises regularly, mainly on the elliptical or walking on the treadmill but states this can become bothersome to the knee after prolonged period of time. States she does some light weights. Squats and lunches are very bothersome. Feels  that the R leg is overall weaker than the L.     Pt reports: no significant changes since previous session    She was compliant with home exercise program.  Response to previous treatment: soreness following manual therapy performed. Increased R lateral knee pain for several hours following exercises performed.    Functional change: progression of exercises tolerated well      Pre-Treatment Pain: 0/10  Post-Treatment Pain: 6/10  Location: R knee   TREATMENT     Racquel received therapeutic exercises to develop strength, endurance and core stabilization for 22 minutes including:    Exercise 7/16/2020   Upright bike (for LE strength and endurance)     Bridges with feet propped on exercise ball and knees extended  Smith bridges   3 x 10   SLR abduction     Shuttle squats     7 way hip  2 x 7 on R    Leg press     RDL  15#, 3 x 10   Ball squeeze at upper thighs    LAQ with trunk extension     SLR flexion  3#, 3 x 8 on R    SLR adduction  3#, 3 x 10    Donkey kicks on exercise ball     Calf raises     IT band stretch     SLR abduction with external rotation and extension    Ball Mini Squats         Racquel received the following manual therapy techniques: Myofacial release, Soft tissue Mobilization and Friction Massage were applied to the: R leg for 15 minutes, including:  STM of right piriformis, vastus lateralis, lateral hamstring and TFL. Friction massage of R ITB   Grade III medial glide of R patella. Medial tilting of R patella.      Racquel participated in neuromuscular re-education activities to improve: Balance and Proprioception for 8 minutes. The following activities were included:    Exercise 7/16/2020   Side plank hip dips     SLS with overhead press     Steamboats  Red band around ankles   3 x 10 on R    Standing TKE     Mobo Board  Contralateral LE supported on 2 inch step   40 reps in each direction on R                    x = exercise details same as prior session      Home Exercises Provided and Patient  Education Provided     Education/Self-Care provided:    Patient educated on biomechanical justification for therapeutic exercise and importance of compliance with HEP in order to improve overall impairments and QOL    Patient educated on the importance of improved core and lower extremity strength in order to improve alignment of the spine and lower extremities with static positions and dynamic movement.    Patient educated on the importance of strong core and lower extremity musculature in order to improve both static and dynamic balance, improve gait mechanics and improve household and community mobility.     Written Home Exercises Provided: yes.  Exercises were reviewed and Racquel was able to demonstrate them prior to the end of the session.  Racquel demonstrated good  understanding of the education provided.     See EMR under Patient Instructions for exercises provided 6/18/2020.    ASSESSMENT   Pt tolerated manual therapy well with increased tone and continued reports of tenderness to palpation of R ITB, TFL and piriformis, and soreness following intervention. Patient tolerated all exercises well with reports of tolerable increase in pain at R lateral knee during interventions. Se continues to report greater pain with standing interventions compared to interventions on the mat. Patient initially has pain and grinding in the R knee with RDL's; ball added between the thighs to promote activation of medial quadriceps and patient reports decreased pain and grinding in knee. Patient was able to maintain good form with all exercises following initial demonstration and minimal cueing needed.     Racquel is progressing well towards her goals.   Pt prognosis is Good.     Pt will continue to benefit from skilled outpatient physical therapy to address the deficits listed in the problem list box on initial evaluation, provide pt/family education and to maximize pt's level of independence in the home and community  environment.     Pt's spiritual, cultural and educational needs considered and pt agreeable to plan of care and goals.     Anticipated Barriers for therapy: chronicity of condition, lack of understanding of condition and adherence to treatment plan    GOALS:     Short Term Goals:  6 weeks     1. Pain: Pt will demonstrate improved pain by reports of less than or equal to 5/10 worst pain on the verbal rating scale in order to progress toward maximal functional ability and improve QOL.     2. Function: Patient will demonstrate improved function as indicated by a functional status score of less than or equal to 35 out of 100 on FOTO.     3. Strength: Patient will improve strength to 50% of stated goals, listed in objective measures above, in order to progress towards independence with functional activities.      4. Gait: Patient will demonstrate improved gait mechanics including equal stance time B in order to improve functional mobility, improve quality of life, and decrease risk of further injury or fall.      5. HEP: Patient will demonstrate independence with HEP in order to progress toward functional independence.        Long Term Goals:  12 weeks     1. Pain: Pt will demonstrate improved pain by reports of less than or equal to 3/10 worst pain on the verbal rating scale in order to progress toward maximal functional ability and improve QOL.       2. Function: Patient will demonstrate improved function as indicated by a functional status score of less than or equal to 27 out of 100 on FOTO.     3. Strength: Patient will improve strength to stated goals, listed in objective measures above, in order to improve functional independence and quality of life.     4. Gait: Patient will demonstrate normalized running mechanics with minimal compensation in order to return to PLOF.     5. Patient will return to normal ADL's, IADL's, community involvement, recreational activities, and work-related activities with less than or  equal to 3/10 pain and maximal function.           PLAN   Continue Plan of Care (POC) and progress per patient tolerance.     Evaluation: 6/11/2020  POC Expiration: 9/9/2020    Judy Kamara PT, DPT

## 2020-08-06 ENCOUNTER — OFFICE VISIT (OUTPATIENT)
Dept: ORTHOPEDICS | Facility: CLINIC | Age: 30
End: 2020-08-06
Payer: MEDICAID

## 2020-08-06 VITALS
SYSTOLIC BLOOD PRESSURE: 119 MMHG | WEIGHT: 217.63 LBS | HEART RATE: 81 BPM | BODY MASS INDEX: 34.16 KG/M2 | HEIGHT: 67 IN | DIASTOLIC BLOOD PRESSURE: 68 MMHG

## 2020-08-06 DIAGNOSIS — M25.561 CHRONIC PAIN OF RIGHT KNEE: ICD-10-CM

## 2020-08-06 DIAGNOSIS — M25.561 ANTERIOR KNEE PAIN, RIGHT: Primary | ICD-10-CM

## 2020-08-06 DIAGNOSIS — M94.261 CHONDROMALACIA OF KNEE, RIGHT: ICD-10-CM

## 2020-08-06 DIAGNOSIS — G89.29 CHRONIC PAIN OF RIGHT KNEE: ICD-10-CM

## 2020-08-06 PROCEDURE — 99214 OFFICE O/P EST MOD 30 MIN: CPT | Mod: 25,S$PBB,, | Performed by: PHYSICIAN ASSISTANT

## 2020-08-06 PROCEDURE — 99999 PR PBB SHADOW E&M-EST. PATIENT-LVL III: ICD-10-PCS | Mod: PBBFAC,,, | Performed by: PHYSICIAN ASSISTANT

## 2020-08-06 PROCEDURE — 99999 PR PBB SHADOW E&M-EST. PATIENT-LVL III: CPT | Mod: PBBFAC,,, | Performed by: PHYSICIAN ASSISTANT

## 2020-08-06 PROCEDURE — 99214 PR OFFICE/OUTPT VISIT, EST, LEVL IV, 30-39 MIN: ICD-10-PCS | Mod: 25,S$PBB,, | Performed by: PHYSICIAN ASSISTANT

## 2020-08-06 PROCEDURE — 20610 LARGE JOINT ASPIRATION/INJECTION: R KNEE: ICD-10-PCS | Mod: S$PBB,RT,, | Performed by: PHYSICIAN ASSISTANT

## 2020-08-06 PROCEDURE — 20610 DRAIN/INJ JOINT/BURSA W/O US: CPT | Mod: S$PBB,RT,, | Performed by: PHYSICIAN ASSISTANT

## 2020-08-06 PROCEDURE — 20610 DRAIN/INJ JOINT/BURSA W/O US: CPT | Mod: PBBFAC | Performed by: PHYSICIAN ASSISTANT

## 2020-08-06 PROCEDURE — 99213 OFFICE O/P EST LOW 20 MIN: CPT | Mod: PBBFAC,25 | Performed by: PHYSICIAN ASSISTANT

## 2020-08-06 RX ORDER — METHYLPREDNISOLONE ACETATE 80 MG/ML
80 INJECTION, SUSPENSION INTRA-ARTICULAR; INTRALESIONAL; INTRAMUSCULAR; SOFT TISSUE
Status: DISCONTINUED | OUTPATIENT
Start: 2020-08-06 | End: 2020-08-06 | Stop reason: HOSPADM

## 2020-08-06 RX ADMIN — METHYLPREDNISOLONE ACETATE 80 MG: 80 INJECTION, SUSPENSION INTRALESIONAL; INTRAMUSCULAR; INTRASYNOVIAL; SOFT TISSUE at 08:08

## 2020-08-06 NOTE — PROGRESS NOTES
Patient ID: Racquel Yepez is a 29 y.o. female.    Chief Complaint: Pain of the Right Knee      HPI: Racquel Yepez  is a 29 y.o. female who c/o Pain of the Right Knee   for duration of couple of years.  I saw her recently in ordered physical therapy.  She feels like physical therapy has helped somewhat.  It is definitely making her she is stronger.  She says she is better but not great.  She has increased symptoms with deep squats and stairs.  She has improved at rest.  Pain it seems to be intermittent.  She has good days and bad days.  Quality is throbbing and aching.  She complains of associated grinding in the knee cap.    Past Medical History:   Diagnosis Date    Abnormal Pap smear     colposcopy-- normal since    Anemia     Ectopic pregnancy      Past Surgical History:   Procedure Laterality Date    ABSCESS DRAINAGE  2016    left breast     SECTION, LOW TRANSVERSE  2015    CHOLECYSTECTOMY      ECTOPIC PREGNANCY SURGERY  3/3/2014    Laparotomy for left cornual pregnancy     gastric sleeve  2017     Family History   Problem Relation Age of Onset    Diabetes Father     Hypertension Father     Diabetes Mother     Breast cancer Neg Hx     Colon cancer Neg Hx     Ovarian cancer Neg Hx     Stroke Neg Hx      Social History     Socioeconomic History    Marital status: Single     Spouse name: Not on file    Number of children: Not on file    Years of education: Not on file    Highest education level: Not on file   Occupational History    Not on file   Social Needs    Financial resource strain: Not on file    Food insecurity     Worry: Not on file     Inability: Not on file    Transportation needs     Medical: Not on file     Non-medical: Not on file   Tobacco Use    Smoking status: Never Smoker    Smokeless tobacco: Never Used   Substance and Sexual Activity    Alcohol use: Yes     Frequency: 2-4 times a month     Drinks per session: 1 or 2     Binge  frequency: Never     Comment: socially    Drug use: No    Sexual activity: Yes     Partners: Male     Birth control/protection: Implant   Lifestyle    Physical activity     Days per week: Not on file     Minutes per session: Not on file    Stress: Not on file   Relationships    Social connections     Talks on phone: Not on file     Gets together: Not on file     Attends Sabianism service: Not on file     Active member of club or organization: Not on file     Attends meetings of clubs or organizations: Not on file     Relationship status: Not on file   Other Topics Concern    Not on file   Social History Narrative    Not on file     Medication List with Changes/Refills   Current Medications    ETONOGESTREL (NEXPLANON) 68 MG IMPL SUBDERMAL DEVICE    68 mg by Subdermal route.    MELOXICAM (MOBIC) 15 MG TABLET    Take 1 tablet (15 mg total) by mouth once daily. Take with food.  Discontinue if you develop GI side effects.    PHENTERMINE (ADIPEX-P) 37.5 MG TABLET    Take 37.5 mg by mouth before breakfast.    TOPIRAMATE (TOPAMAX) 50 MG TABLET    TAKE ONE TABLET BY MOUTH ONE TIME DAILY     Review of patient's allergies indicates:  No Known Allergies    Objective:        General    Nursing note and vitals reviewed.  Constitutional: She is oriented to person, place, and time. She appears well-developed and well-nourished.   HENT:   Head: Normocephalic and atraumatic.   Eyes: EOM are normal.   Cardiovascular: Normal rate and regular rhythm.    Pulmonary/Chest: Effort normal.   Abdominal: Soft.   Neurological: She is alert and oriented to person, place, and time.   Psychiatric: She has a normal mood and affect. Her behavior is normal.     General Musculoskeletal Exam   Gait: normal       Right Knee Exam     Inspection   Erythema: absent  Swelling: absent  Effusion: absent  Deformity: absent  Bruising: absent    Tenderness   The patient is tender to palpation of the lateral retinaculum.    Crepitus   The patient has  crepitus of the patella.    Range of Motion   Extension: normal   Flexion: normal     Tests   Meniscus   Ayana:  Medial - negative Lateral - negative  Ligament Examination Lachman: normal (-1 to 2mm) PCL-Posterior Drawer: normal (0 to 2mm)     MCL - Valgus: normal (0 to 2mm)  LCL - Varus: normal  Patella   Patellar apprehension: negative  Patellar Tracking: normal  Patellar Grind: positive    Other   Meniscal Cyst: absent  Popliteal (Baker's) Cyst: absent  Sensation: normal    Comments:  Comp soft, cap refill < 2 sec.      Left Knee Exam     Range of Motion   Extension: normal   Flexion: normal     Tests   Stability Lachman: normal (-1 to 2mm) PCL-Posterior Drawer: normal (0 to 2mm)  MCL - Valgus: normal (0 to 2mm)  LCL - Varus: normal (0 to 2mm)    Other   Sensation: normal    Muscle Strength   Right Lower Extremity   Quadriceps:  5/5   Hamstrin/5   Left Lower Extremity   Quadriceps:  5/5   Hamstrin/5     Vascular Exam       Edema  Right Lower Leg: absent  Left Lower Leg: absent        Assessment:       Encounter Diagnoses   Name Primary?    Anterior knee pain, right Yes    Chondromalacia of knee, right     Chronic pain of right knee           Plan:       Racquel was seen today for pain.    Diagnoses and all orders for this visit:    Anterior knee pain, right  -     Large Joint Aspiration/Injection: R knee    Chondromalacia of knee, right  -     Large Joint Aspiration/Injection: R knee    Chronic pain of right knee  -     Large Joint Aspiration/Injection: R knee        Racquel Yepez is an established pt who comes in today for follow-up on the above she will continue with physical therapy and transition to a home exercise program.  We have discussed risks and benefits of corticosteroid injection.  She wishes to proceed with that.  She does not like taking oral NSAIDs, or any medication for that matter.  Instead I have recommended Voltaren gel 2 g topically 3 times a day as needed.  I will see  her back in the office in 1 month.  If she is not doing any better, would recommend further diagnostic workup at that point.  She verbalizes understanding and agrees.    Follow up in about 1 month (around 9/6/2020) for no xray.    The patient understands, chooses and consents to this plan and accepts all   the risks which include but are not limited to the risks mentioned above.     Disclaimer: This note was prepared using a voice recognition system and is likely to have sound alike errors within the text.

## 2020-08-06 NOTE — PROCEDURES
Large Joint Aspiration/Injection: R knee    Date/Time: 8/6/2020 8:00 AM  Performed by: Delfina Olvera PA-C  Authorized by: Delfina Olvera PA-C     Consent Done?:  Yes (Verbal)  Indications:  Pain  Site marked: the procedure site was marked    Timeout: prior to procedure the correct patient, procedure, and site was verified    Prep: patient was prepped and draped in usual sterile fashion      Local anesthesia used?: Yes    Local anesthetic:  Lidocaine 1% without epinephrine  Anesthetic total (ml):  2      Details:  Needle Size:  22 G  Ultrasonic Guidance for needle placement?: No    Approach:  Anterolateral  Location:  Knee  Site:  R knee  Medications:  80 mg methylPREDNISolone acetate 80 mg/mL  Patient tolerance:  Patient tolerated the procedure well with no immediate complications     After verbal consent was obtained for right knee injection, patient ID, site, and side were verified.  The  right  knee was sterilly prepped in the standard fashion.  A 22-gauge needle was introduced into right knee joint from an boo-lateral site without complication. The right knee was then injected with 20 mg lidocaine plain and 80 mg depomedrol.  A sterile bandaid was applied.  The patient was informed to apply an ice pack approximately 10min once arriving home and not to do anything strenuous for 24hours.  Elevation of glucose in diabetic patients was discussed.  She was instructed to call if there were any problems. The patient was discharged in stable condition.

## 2020-10-13 ENCOUNTER — DOCUMENTATION ONLY (OUTPATIENT)
Dept: REHABILITATION | Facility: HOSPITAL | Age: 30
End: 2020-10-13

## 2021-01-27 ENCOUNTER — OFFICE VISIT (OUTPATIENT)
Dept: OBSTETRICS AND GYNECOLOGY | Facility: CLINIC | Age: 31
End: 2021-01-27
Payer: MEDICAID

## 2021-01-27 VITALS
WEIGHT: 225.06 LBS | HEIGHT: 67 IN | BODY MASS INDEX: 35.33 KG/M2 | DIASTOLIC BLOOD PRESSURE: 82 MMHG | SYSTOLIC BLOOD PRESSURE: 126 MMHG

## 2021-01-27 DIAGNOSIS — Z97.5 NEXPLANON IN PLACE: ICD-10-CM

## 2021-01-27 DIAGNOSIS — Z01.419 ROUTINE GYNECOLOGICAL EXAMINATION: Primary | ICD-10-CM

## 2021-01-27 PROCEDURE — 99213 OFFICE O/P EST LOW 20 MIN: CPT | Mod: PBBFAC | Performed by: NURSE PRACTITIONER

## 2021-01-27 PROCEDURE — 99999 PR PBB SHADOW E&M-EST. PATIENT-LVL III: ICD-10-PCS | Mod: PBBFAC,,, | Performed by: NURSE PRACTITIONER

## 2021-01-27 PROCEDURE — 99395 PREV VISIT EST AGE 18-39: CPT | Mod: S$PBB,,, | Performed by: NURSE PRACTITIONER

## 2021-01-27 PROCEDURE — 99999 PR PBB SHADOW E&M-EST. PATIENT-LVL III: CPT | Mod: PBBFAC,,, | Performed by: NURSE PRACTITIONER

## 2021-01-27 PROCEDURE — 99395 PR PREVENTIVE VISIT,EST,18-39: ICD-10-PCS | Mod: S$PBB,,, | Performed by: NURSE PRACTITIONER

## 2021-04-28 ENCOUNTER — PATIENT MESSAGE (OUTPATIENT)
Dept: RESEARCH | Facility: HOSPITAL | Age: 31
End: 2021-04-28

## 2021-10-05 ENCOUNTER — OFFICE VISIT (OUTPATIENT)
Dept: OBSTETRICS AND GYNECOLOGY | Facility: CLINIC | Age: 31
End: 2021-10-05
Payer: MEDICAID

## 2021-10-05 VITALS
HEIGHT: 67 IN | WEIGHT: 240.06 LBS | SYSTOLIC BLOOD PRESSURE: 120 MMHG | BODY MASS INDEX: 37.68 KG/M2 | DIASTOLIC BLOOD PRESSURE: 78 MMHG

## 2021-10-05 DIAGNOSIS — Z30.017 ENCOUNTER FOR INITIAL PRESCRIPTION OF NEXPLANON: Primary | ICD-10-CM

## 2021-10-05 PROCEDURE — 99999 PR PBB SHADOW E&M-EST. PATIENT-LVL III: CPT | Mod: PBBFAC,,, | Performed by: NURSE PRACTITIONER

## 2021-10-05 PROCEDURE — 99213 OFFICE O/P EST LOW 20 MIN: CPT | Mod: PBBFAC | Performed by: NURSE PRACTITIONER

## 2021-10-05 PROCEDURE — 99212 OFFICE O/P EST SF 10 MIN: CPT | Mod: S$PBB,,, | Performed by: NURSE PRACTITIONER

## 2021-10-05 PROCEDURE — 99212 PR OFFICE/OUTPT VISIT, EST, LEVL II, 10-19 MIN: ICD-10-PCS | Mod: S$PBB,,, | Performed by: NURSE PRACTITIONER

## 2021-10-05 PROCEDURE — 99999 PR PBB SHADOW E&M-EST. PATIENT-LVL III: ICD-10-PCS | Mod: PBBFAC,,, | Performed by: NURSE PRACTITIONER

## 2021-10-06 ENCOUNTER — PROCEDURE VISIT (OUTPATIENT)
Dept: OBSTETRICS AND GYNECOLOGY | Facility: CLINIC | Age: 31
End: 2021-10-06
Payer: MEDICAID

## 2021-10-06 VITALS
WEIGHT: 241.19 LBS | DIASTOLIC BLOOD PRESSURE: 84 MMHG | BODY MASS INDEX: 37.85 KG/M2 | HEIGHT: 67 IN | SYSTOLIC BLOOD PRESSURE: 132 MMHG

## 2021-10-06 DIAGNOSIS — Z30.46 ENCOUNTER FOR REMOVAL AND REINSERTION OF NEXPLANON: Primary | ICD-10-CM

## 2021-10-06 PROCEDURE — 11981 INSERTION DRUG DLVR IMPLANT: CPT | Mod: PBBFAC | Performed by: NURSE PRACTITIONER

## 2021-10-06 PROCEDURE — 11983 REMOVE/INSERT DRUG IMPLANT: CPT | Mod: S$PBB,,, | Performed by: NURSE PRACTITIONER

## 2021-10-06 PROCEDURE — 11983 INSERTION OF NEXPLANON: ICD-10-PCS | Mod: S$PBB,,, | Performed by: NURSE PRACTITIONER

## 2021-10-06 PROCEDURE — 11982 REMOVE DRUG IMPLANT DEVICE: CPT | Mod: PBBFAC | Performed by: NURSE PRACTITIONER

## 2021-10-06 PROCEDURE — 81025 URINE PREGNANCY TEST: CPT | Mod: PBBFAC | Performed by: NURSE PRACTITIONER

## 2021-10-06 PROCEDURE — 11983 REMOVE/INSERT DRUG IMPLANT: CPT | Mod: PBBFAC

## 2021-10-06 RX ADMIN — ETONOGESTREL 68 MG: 68 IMPLANT SUBCUTANEOUS at 08:10

## 2022-03-22 ENCOUNTER — TELEPHONE (OUTPATIENT)
Dept: BARIATRICS | Facility: CLINIC | Age: 32
End: 2022-03-22
Payer: MEDICAID

## 2022-08-03 ENCOUNTER — PATIENT MESSAGE (OUTPATIENT)
Dept: ORTHOPEDICS | Facility: CLINIC | Age: 32
End: 2022-08-03
Payer: MEDICAID

## 2022-08-03 DIAGNOSIS — M25.561 RIGHT KNEE PAIN, UNSPECIFIED CHRONICITY: Primary | ICD-10-CM

## 2022-09-16 ENCOUNTER — TELEPHONE (OUTPATIENT)
Dept: ORTHOPEDICS | Facility: CLINIC | Age: 32
End: 2022-09-16
Payer: MEDICAID

## 2022-09-16 NOTE — TELEPHONE ENCOUNTER
Returned pt's call. Pt stated that she does not need to see a surgeon. Scheduled soonest available w/ non-op. Verbalized understanding of xr arrival time    ----- Message from Gaby Lyons sent at 9/16/2022 11:04 AM CDT -----  Contact: [phone]  Patient Is requesting to be rescheduled from missed appointment please call patient back at [phone].Thanks

## 2022-09-19 ENCOUNTER — HOSPITAL ENCOUNTER (OUTPATIENT)
Dept: RADIOLOGY | Facility: HOSPITAL | Age: 32
Discharge: HOME | End: 2022-09-19
Attending: ORTHOPAEDIC SURGERY
Payer: MEDICAID

## 2022-09-19 ENCOUNTER — OFFICE VISIT (OUTPATIENT)
Dept: ORTHOPEDICS | Facility: CLINIC | Age: 32
End: 2022-09-19
Payer: MEDICAID

## 2022-09-19 VITALS — BODY MASS INDEX: 37.83 KG/M2 | HEIGHT: 67 IN | WEIGHT: 241 LBS

## 2022-09-19 DIAGNOSIS — G89.29 CHRONIC PAIN OF RIGHT KNEE: ICD-10-CM

## 2022-09-19 DIAGNOSIS — M22.2X1 PATELLOFEMORAL PAIN SYNDROME OF RIGHT KNEE: Primary | ICD-10-CM

## 2022-09-19 DIAGNOSIS — M25.561 RIGHT KNEE PAIN, UNSPECIFIED CHRONICITY: ICD-10-CM

## 2022-09-19 DIAGNOSIS — M25.561 CHRONIC PAIN OF RIGHT KNEE: ICD-10-CM

## 2022-09-19 PROCEDURE — 73564 X-RAY EXAM KNEE 4 OR MORE: CPT | Mod: 26,RT,, | Performed by: RADIOLOGY

## 2022-09-19 PROCEDURE — 1160F RVW MEDS BY RX/DR IN RCRD: CPT | Mod: CPTII,,, | Performed by: PHYSICIAN ASSISTANT

## 2022-09-19 PROCEDURE — 99213 PR OFFICE/OUTPT VISIT, EST, LEVL III, 20-29 MIN: ICD-10-PCS | Mod: S$PBB,,, | Performed by: PHYSICIAN ASSISTANT

## 2022-09-19 PROCEDURE — 73562 X-RAY EXAM OF KNEE 3: CPT | Mod: 26,LT,, | Performed by: RADIOLOGY

## 2022-09-19 PROCEDURE — 3008F BODY MASS INDEX DOCD: CPT | Mod: CPTII,,, | Performed by: PHYSICIAN ASSISTANT

## 2022-09-19 PROCEDURE — 1159F MED LIST DOCD IN RCRD: CPT | Mod: CPTII,,, | Performed by: PHYSICIAN ASSISTANT

## 2022-09-19 PROCEDURE — 1159F PR MEDICATION LIST DOCUMENTED IN MEDICAL RECORD: ICD-10-PCS | Mod: CPTII,,, | Performed by: PHYSICIAN ASSISTANT

## 2022-09-19 PROCEDURE — 1160F PR REVIEW ALL MEDS BY PRESCRIBER/CLIN PHARMACIST DOCUMENTED: ICD-10-PCS | Mod: CPTII,,, | Performed by: PHYSICIAN ASSISTANT

## 2022-09-19 PROCEDURE — 3008F PR BODY MASS INDEX (BMI) DOCUMENTED: ICD-10-PCS | Mod: CPTII,,, | Performed by: PHYSICIAN ASSISTANT

## 2022-09-19 PROCEDURE — 99999 PR PBB SHADOW E&M-EST. PATIENT-LVL III: ICD-10-PCS | Mod: PBBFAC,,, | Performed by: PHYSICIAN ASSISTANT

## 2022-09-19 PROCEDURE — 73564 XR KNEE ORTHO RIGHT WITH FLEXION: ICD-10-PCS | Mod: 26,RT,, | Performed by: RADIOLOGY

## 2022-09-19 PROCEDURE — 99999 PR PBB SHADOW E&M-EST. PATIENT-LVL III: CPT | Mod: PBBFAC,,, | Performed by: PHYSICIAN ASSISTANT

## 2022-09-19 PROCEDURE — 73562 XR KNEE ORTHO RIGHT WITH FLEXION: ICD-10-PCS | Mod: 26,LT,, | Performed by: RADIOLOGY

## 2022-09-19 PROCEDURE — 99213 OFFICE O/P EST LOW 20 MIN: CPT | Mod: PBBFAC | Performed by: PHYSICIAN ASSISTANT

## 2022-09-19 PROCEDURE — 99213 OFFICE O/P EST LOW 20 MIN: CPT | Mod: S$PBB,,, | Performed by: PHYSICIAN ASSISTANT

## 2022-09-19 PROCEDURE — 73562 X-RAY EXAM OF KNEE 3: CPT | Mod: TC,LT

## 2022-09-19 RX ORDER — MELOXICAM 15 MG/1
15 TABLET ORAL DAILY
Qty: 30 TABLET | Refills: 0 | Status: SHIPPED | OUTPATIENT
Start: 2022-09-19

## 2022-09-19 NOTE — PROGRESS NOTES
Orthopaedics Sports Medicine     Knee Initial Visit         2022    Referring MD: Self, Aaareferral    Chief Complaint   Patient presents with    Right Knee - Pain       History of Present Illness:   Racquel Machado is a 32 y.o. year old female patient presents with pain and dysfunction involving the right knee.     Onset of the symptoms was years ago, no known injury or trauma; however she is active in softball, positions 1st and 3rd base. She was seen by Delfina Olvera PA-C on 2020 at which point she received a CSI in her right knee. She got about 1 month of relief with this CSI. The patient returns today reporting that the symptoms have returned and are worsening and is interested in proceeding with other options.     Current symptoms include anterior and lateral knee pain, popping, clicking    Pain is aggravated by STS positioning, going up and down stairs, twisting movement of her knee, endorses night pain.      Evaluation to date: x-ray     Treatment to date: rest, activity modification, anti-inflammatories, physical therapy, patellar tracking brace, CSI      Past Medical History:   Past Medical History:   Diagnosis Date    Abnormal Pap smear     colposcopy-- normal since    Anemia     Ectopic pregnancy        Past Surgical History:   Past Surgical History:   Procedure Laterality Date    ABSCESS DRAINAGE  2016    left breast     SECTION, LOW TRANSVERSE  2015    CHOLECYSTECTOMY      ECTOPIC PREGNANCY SURGERY  3/3/2014    Laparotomy for left cornual pregnancy     gastric sleeve  2017       Medications:  Patient's Medications   New Prescriptions    MELOXICAM (MOBIC) 15 MG TABLET    Take 1 tablet (15 mg total) by mouth once daily.   Previous Medications    ETONOGESTREL (NEXPLANON) 68 MG IMPL SUBDERMAL DEVICE    68 mg by Subdermal route.    PHENTERMINE (ADIPEX-P) 37.5 MG TABLET    Take 37.5 mg by mouth before breakfast.    TOPIRAMATE (TOPAMAX) 50 MG TABLET    TAKE ONE  "TABLET BY MOUTH ONE TIME DAILY   Modified Medications    No medications on file   Discontinued Medications    No medications on file        Allergies: Review of patient's allergies indicates:  No Known Allergies    Social History:   [place]: Francine Roca  occupation: Disaster Relief managment  alcohol use: She reports current alcohol use.  tobacco use: She reports that she has never smoked. She has never used smokeless tobacco.    Review of systems:  history of recent illness, fevers, shakes, or chills: no  history of cardiac problems or chest pain: no  history of of pulmonary problems or asthma: no  history of diabetes: no  history of prior DVT or clotting problems: no  history of sleep apnea: no    Physical Examination:  Estimated body mass index is 37.75 kg/m² as calculated from the following:    Height as of this encounter: 5' 7" (1.702 m).    Weight as of this encounter: 109.3 kg (241 lb).    Standing exam  stance: normal alignment, no significant leg-length discrepancy  gait: no limp      Knee      RIGHT  LEFT  Skin:     Intact   Intact  ROM:     0-130  0-130  Effusion:    Neg   Neg  Medial joint line tenderness:  Neg   Neg  Lateral joint line tenderness:  +   Neg  Ayana:     +   Neg  Patella crepitus:   +   +  Patella tenderness:   Neg   Neg  Patella grind:      +   Neg  Lachman:    Neg   Neg  Pivot shift:    Neg   Neg  Valgus stress:    Neg   Neg  Varus stress:    Neg   Neg  Posterior drawer:   Neg   Neg  N-V               intact  intact  Hip:    nml    nml   Lower extremity edema: Negative negative    Neurovascular exam  - motor function grossly intact bilaterally to hip flexion, knee extension and flexion, ankle dorsiflexion and plantarflexion  - sensation intact to light touch bilaterally to femoral, tibial, tibial and peroneal distributions  - symmetrical pedal pulses    Imaging:  X-ray Knee Ortho Right with Flexion  Narrative: EXAMINATION:  XR KNEE ORTHO RIGHT WITH FLEXION    INDICATION:  Pain in " right knee    COMPARISON:  Knee radiographs from 06/04/2020    FINDINGS:  AP, PA flexion, sunrise of both knees are obtained with a lateral view of the right knee.  There is no fracture.  Alignment is anatomic.  There are minimal degenerative changes of all 3 compartments of both knees manifesting as nonuniform joint space narrowing and small marginal osteophyte formation.  There is slight lateral subluxation of both patella.  No joint effusion is present the right.  No chondrocalcinosis.  Impression: 1. Minimal degenerative changes bilaterally.    Electronically signed by: Zana Peñaloza MD  Date:    09/19/2022  Time:    14:16       Physician Read: I agree with the above impression.    Impression:  32 y.o. female with right patellofemoral pain syndrome    Plan:  Discussed diagnosis and treatment options with the patient today. Her history, physical exam, and imaging is suggestive of PFPS of right knee  She has already tried several treatment options including rest, activity modification, anti-inflammatories, physical therapy, patellar tracking brace, CSI all with minimal relief of symptoms. I recommend MRI of her right knee at this time due to worsening of symptoms and no improvement with first and second line therapies  Rx for Mobic 15mg provided  Continue at home physical therapy exercises. Knee HEP reviewed.  Follow up with me virtually after MRI to discuss results       Sara Darnell PA-C  Sports Medicine Physician Assistant

## 2022-10-10 ENCOUNTER — PATIENT MESSAGE (OUTPATIENT)
Dept: ORTHOPEDICS | Facility: CLINIC | Age: 32
End: 2022-10-10
Payer: MEDICAID

## 2022-11-28 ENCOUNTER — HOSPITAL ENCOUNTER (OUTPATIENT)
Dept: RADIOLOGY | Facility: HOSPITAL | Age: 32
Discharge: HOME | End: 2022-11-28
Attending: PHYSICIAN ASSISTANT
Payer: MEDICAID

## 2022-11-28 DIAGNOSIS — M22.2X1 PATELLOFEMORAL PAIN SYNDROME OF RIGHT KNEE: ICD-10-CM

## 2022-11-28 PROCEDURE — 73721 MRI JNT OF LWR EXTRE W/O DYE: CPT | Mod: TC,RT

## 2022-12-02 ENCOUNTER — OFFICE VISIT (OUTPATIENT)
Dept: ORTHOPEDICS | Facility: CLINIC | Age: 32
End: 2022-12-02
Payer: MEDICAID

## 2022-12-02 VITALS — BODY MASS INDEX: 37.83 KG/M2 | WEIGHT: 241 LBS | HEIGHT: 67 IN

## 2022-12-02 DIAGNOSIS — M22.2X1 PATELLOFEMORAL PAIN SYNDROME OF RIGHT KNEE: Primary | ICD-10-CM

## 2022-12-02 DIAGNOSIS — M17.11 PRIMARY OSTEOARTHRITIS OF RIGHT KNEE: ICD-10-CM

## 2022-12-02 DIAGNOSIS — M25.561 CHRONIC PAIN OF RIGHT KNEE: ICD-10-CM

## 2022-12-02 DIAGNOSIS — G89.29 CHRONIC PAIN OF RIGHT KNEE: ICD-10-CM

## 2022-12-02 PROCEDURE — 1160F PR REVIEW ALL MEDS BY PRESCRIBER/CLIN PHARMACIST DOCUMENTED: ICD-10-PCS | Mod: CPTII,,, | Performed by: PHYSICIAN ASSISTANT

## 2022-12-02 PROCEDURE — 1160F RVW MEDS BY RX/DR IN RCRD: CPT | Mod: CPTII,,, | Performed by: PHYSICIAN ASSISTANT

## 2022-12-02 PROCEDURE — 99213 OFFICE O/P EST LOW 20 MIN: CPT | Mod: PBBFAC | Performed by: PHYSICIAN ASSISTANT

## 2022-12-02 PROCEDURE — 99999 PR PBB SHADOW E&M-EST. PATIENT-LVL III: ICD-10-PCS | Mod: PBBFAC,,, | Performed by: PHYSICIAN ASSISTANT

## 2022-12-02 PROCEDURE — 99213 OFFICE O/P EST LOW 20 MIN: CPT | Mod: S$PBB,,, | Performed by: PHYSICIAN ASSISTANT

## 2022-12-02 PROCEDURE — 99213 PR OFFICE/OUTPT VISIT, EST, LEVL III, 20-29 MIN: ICD-10-PCS | Mod: S$PBB,,, | Performed by: PHYSICIAN ASSISTANT

## 2022-12-02 PROCEDURE — 1159F PR MEDICATION LIST DOCUMENTED IN MEDICAL RECORD: ICD-10-PCS | Mod: CPTII,,, | Performed by: PHYSICIAN ASSISTANT

## 2022-12-02 PROCEDURE — 1159F MED LIST DOCD IN RCRD: CPT | Mod: CPTII,,, | Performed by: PHYSICIAN ASSISTANT

## 2022-12-02 PROCEDURE — 3008F BODY MASS INDEX DOCD: CPT | Mod: CPTII,,, | Performed by: PHYSICIAN ASSISTANT

## 2022-12-02 PROCEDURE — 99999 PR PBB SHADOW E&M-EST. PATIENT-LVL III: CPT | Mod: PBBFAC,,, | Performed by: PHYSICIAN ASSISTANT

## 2022-12-02 PROCEDURE — 3008F PR BODY MASS INDEX (BMI) DOCUMENTED: ICD-10-PCS | Mod: CPTII,,, | Performed by: PHYSICIAN ASSISTANT

## 2022-12-02 NOTE — PROGRESS NOTES
Orthopaedic Follow-Up Visit    Last Appointment: 09/19/2022  Diagnosis: Patellofemoral pain syndrome of right knee  Prior Procedure: CSI 08/06/2020    Racquel Machado is a 32 y.o. female who is here for f/u evaluation of the RIGHT knee. The patient was last seen here by me on 09/19/2022 at which point we decided to send her an MRI prior to considering further treatment options. The patient returns today reporting that the symptoms are unchanged and is interested in proceeding with further options.     To review her history, Racquel Machado is a 32 y.o. year old female patient presents with pain and dysfunction involving the right knee. Onset of the symptoms was years ago, no known injury or trauma; however she is active in softball, positions 1st and 3rd base. She was seen by Delfina Olvera PA-C on 8/6/2020 at which point she received a CSI in her right knee. She got about 1 month of relief with this CSI. The patient returns today reporting that the symptoms have returned and are worsening and is interested in proceeding with other options. Current symptoms include anterior and lateral knee pain, popping, clicking. Pain is aggravated by STS positioning, going up and down stairs, twisting movement of her knee, endorses night pain.      Patient's medications, allergies, past medical, surgical, social and family histories were reviewed and updated as appropriate.    Review of Systems   All systems reviewed were negative.  Specifically, the patient denies fever, chills, weight loss, chest pain, shortness of breath, or dyspnea on exertion.      Past Medical History:   Diagnosis Date    Abnormal Pap smear 2012    colposcopy-- normal since    Anemia     Ectopic pregnancy        Objective:      Physical Exam  Patient is alert and oriented, no distress. Skin is intact. Neuro is normal with no focal motor or sensory findings.    Standing exam  stance: normal alignment, no significant leg-length  discrepancy  gait: no limp      Knee      RIGHT  LEFT  Skin:     Intact   Intact  ROM:     0-130  0-130  Effusion:    Neg   Neg  Medial joint line tenderness:  Neg   Neg  Lateral joint line tenderness:  +   Neg  Ayana:     Neg   Neg  Patella crepitus:   +   +  Patella tenderness:   Neg   Neg  Patella grind:      +   Neg  Lachman:    Neg   Neg  Pivot shift:    Neg   Neg  Valgus stress:    Neg   Neg  Varus stress:    Neg   Neg  Posterior drawer:   Neg   Neg  N-V               intact  intact  Hip:    nml    nml   Lower extremity edema: Negative negative    Neurovascular exam  - motor function grossly intact bilaterally to hip flexion, knee extension and flexion, ankle dorsiflexion and plantarflexion  - sensation intact to light touch bilaterally to femoral, tibial, tibial and peroneal distributions  - symmetrical pedal pulses    Imaging:   XR Results:  Results for orders placed during the hospital encounter of 09/19/22    X-ray Knee Ortho Right with Flexion    Narrative  EXAMINATION:  XR KNEE ORTHO RIGHT WITH FLEXION    INDICATION:  Pain in right knee    COMPARISON:  Knee radiographs from 06/04/2020    FINDINGS:  AP, PA flexion, sunrise of both knees are obtained with a lateral view of the right knee.  There is no fracture.  Alignment is anatomic.  There are minimal degenerative changes of all 3 compartments of both knees manifesting as nonuniform joint space narrowing and small marginal osteophyte formation.  There is slight lateral subluxation of both patella.  No joint effusion is present the right.  No chondrocalcinosis.    Impression  1. Minimal degenerative changes bilaterally.      Electronically signed by: Zana Peñaloza MD  Date:    09/19/2022  Time:    14:16      MRI Results:  Results for orders placed during the hospital encounter of 11/28/22    MRI Knee Without Contrast Right    Narrative  EXAMINATION:  MRI KNEE WITHOUT CONTRAST RIGHT    CLINICAL HISTORY:  Knee pain, chronic, negative xray (Age >=  5y);Patellofemoral disorders, right knee    TECHNIQUE:  Multiplanar, multisequence images were performed about the right knee.  No contrast was administered.    COMPARISON:  Right knee x-ray, 09/19/2022    FINDINGS:  The ACL, PCL, lateral collateral complex, popliteus tendon, medial collateral ligament, medial meniscus, and lateral meniscus are intact.  Small joint effusion.    Slight lateral subluxation of the patella.  There is patellar chondromalacia with irregular chondral thinning seen in the median patellar ridge and throughout the lateral facet of the patella.  Tiny full-thickness chondral fissures are seen in the lateral facet with small foci of underlying subchondral marrow edema.    No fracture or avascular necrosis or acute osteochondral lesion.    Impression  1. Slight lateral subluxation of the patella with patellar chondromalacia as detailed above.  2. Small joint effusion.  3. Intact ligaments, tendons, and menisci.      Electronically signed by: Yahir Crowder MD  Date:    11/29/2022  Time:    08:44      CT Results:  No results found for this or any previous visit.      Physician Read: I agree with the above impression.    Assessment/Plan:   Assessment:  Racquel Machado is a 32 y.o. female with with patellofemoral pain syndrome, right knee osteoarthritis    Plan:    Discussed diagnosis and treatment options with patient today.  Her history, physical exam, and imaging is consistent with patellofemoral pain syndrome of her right knee  Reviewed MRI at visit today, discussed conservative treatment in the form of rest, activity modification, anti-inflammatories, physical therapy, knee brace, CSI.  She has tried several of these with minimal relief of symptoms.  Discussed with patient that I think she would be a good candidate for viscosupplementation in the future.  Patient would like to defer any injections at this time.  I recommend we proceed with re-initiation into formal physical therapy  Physical  therapy referral placed to [facility]  Patient was fitted with a patellar tracking brace today  Follow-up with me in 3 months        Sara Darnell PA-C  Sports Medicine Physician Assistant       Disclaimer: This note was prepared using a voice recognition system and is likely to have sound alike errors within the text.

## 2023-02-27 ENCOUNTER — TELEPHONE (OUTPATIENT)
Dept: ORTHOPEDICS | Facility: CLINIC | Age: 33
End: 2023-02-27
Payer: COMMERCIAL

## 2023-02-27 NOTE — TELEPHONE ENCOUNTER
Called patient and assisted with r/s her appointment with Sara Darnell PA-C from 3/3 to 3/1 at 9am, as Sara will not be in clinic during the afternoon of 3/3. Patient verbalized understanding of this change and was grateful for the call.

## 2023-03-01 ENCOUNTER — TELEPHONE (OUTPATIENT)
Dept: ORTHOPEDICS | Facility: CLINIC | Age: 33
End: 2023-03-01

## 2023-03-01 ENCOUNTER — OFFICE VISIT (OUTPATIENT)
Dept: ORTHOPEDICS | Facility: CLINIC | Age: 33
End: 2023-03-01
Payer: COMMERCIAL

## 2023-03-01 VITALS — HEIGHT: 67 IN | BODY MASS INDEX: 37.83 KG/M2 | WEIGHT: 241 LBS

## 2023-03-01 DIAGNOSIS — M22.41 CHONDROMALACIA PATELLAE OF RIGHT KNEE: Primary | ICD-10-CM

## 2023-03-01 DIAGNOSIS — M25.561 CHRONIC PAIN OF RIGHT KNEE: ICD-10-CM

## 2023-03-01 DIAGNOSIS — G89.29 CHRONIC PAIN OF RIGHT KNEE: ICD-10-CM

## 2023-03-01 PROCEDURE — 1159F PR MEDICATION LIST DOCUMENTED IN MEDICAL RECORD: ICD-10-PCS | Mod: CPTII,S$GLB,, | Performed by: PHYSICIAN ASSISTANT

## 2023-03-01 PROCEDURE — 99214 PR OFFICE/OUTPT VISIT, EST, LEVL IV, 30-39 MIN: ICD-10-PCS | Mod: S$GLB,,, | Performed by: PHYSICIAN ASSISTANT

## 2023-03-01 PROCEDURE — 99999 PR PBB SHADOW E&M-EST. PATIENT-LVL III: ICD-10-PCS | Mod: PBBFAC,,, | Performed by: PHYSICIAN ASSISTANT

## 2023-03-01 PROCEDURE — 97760 ORTHOTIC MGMT&TRAING 1ST ENC: CPT | Mod: S$GLB,,, | Performed by: PHYSICIAN ASSISTANT

## 2023-03-01 PROCEDURE — 99999 PR PBB SHADOW E&M-EST. PATIENT-LVL III: CPT | Mod: PBBFAC,,, | Performed by: PHYSICIAN ASSISTANT

## 2023-03-01 PROCEDURE — 99214 OFFICE O/P EST MOD 30 MIN: CPT | Mod: S$GLB,,, | Performed by: PHYSICIAN ASSISTANT

## 2023-03-01 PROCEDURE — 1160F RVW MEDS BY RX/DR IN RCRD: CPT | Mod: CPTII,S$GLB,, | Performed by: PHYSICIAN ASSISTANT

## 2023-03-01 PROCEDURE — 1160F PR REVIEW ALL MEDS BY PRESCRIBER/CLIN PHARMACIST DOCUMENTED: ICD-10-PCS | Mod: CPTII,S$GLB,, | Performed by: PHYSICIAN ASSISTANT

## 2023-03-01 PROCEDURE — 1159F MED LIST DOCD IN RCRD: CPT | Mod: CPTII,S$GLB,, | Performed by: PHYSICIAN ASSISTANT

## 2023-03-01 PROCEDURE — 3008F PR BODY MASS INDEX (BMI) DOCUMENTED: ICD-10-PCS | Mod: CPTII,S$GLB,, | Performed by: PHYSICIAN ASSISTANT

## 2023-03-01 PROCEDURE — 97760 PR ORTHOTIC MGMT&TRAINJ INITIAL ENC EA 15 MINS: ICD-10-PCS | Mod: S$GLB,,, | Performed by: PHYSICIAN ASSISTANT

## 2023-03-01 PROCEDURE — 3008F BODY MASS INDEX DOCD: CPT | Mod: CPTII,S$GLB,, | Performed by: PHYSICIAN ASSISTANT

## 2023-03-01 NOTE — PROGRESS NOTES
Orthopaedic Follow-Up Visit    Last Appointment: 12/02/2022  Diagnosis: Patellofemoral pain syndrome of right knee, Chronic pain of right knee, Primary osteoarthritis of right knee  Prior Procedure: Re-initiation of physical therapy at [facility], knee brace    Racquel Machado is a 32 y.o. female who is here for f/u evaluation of RIGHT knee. The patient was last seen here by me on 12/02/2022 at which point we decided to send her for re-initiation of of physical therapy at [facility] and provided her with a compression knee brace prior to considering further treatment options. The patient returns today reporting that the symptoms have persisted and is interested in proceeding with further treatment options. She has not yet begun formal physical therapy due to time conflicts with her work schedule.     To review her history, Racquel Machado is a 32 y.o. year old female patient presents with pain and dysfunction involving the right knee. Onset of the symptoms was years ago, no known injury or trauma; however she is active in softball, positions 1st and 3rd base. She was seen by Delfina Olvera PA-C on 8/6/2020 at which point she received a CSI in her right knee. She got about 1 month of relief with this CSI. The patient returns today reporting that the symptoms have returned and are worsening and is interested in proceeding with other options. Current symptoms include anterior and lateral knee pain, popping, clicking. Pain is aggravated by STS positioning, going up and down stairs, twisting movement of her knee, endorses night pain.      Patient's medications, allergies, past medical, surgical, social and family histories were reviewed and updated as appropriate.    Review of Systems   All systems reviewed were negative.  Specifically, the patient denies fever, chills, weight loss, chest pain, shortness of breath, or dyspnea on exertion.      Past Medical History:   Diagnosis Date    Abnormal Pap  smear 2012    colposcopy-- normal since    Anemia     Ectopic pregnancy        Objective:      Physical Exam  Patient is alert and oriented, no distress. Skin is intact. Neuro is normal with no focal motor or sensory findings.    Standing exam  stance: mild valgus alignment, no significant leg-length discrepancy  gait: no limp      Knee      RIGHT  LEFT  Skin:     Intact   Intact  ROM:     0-130  0-130  Effusion:    +mild   Neg  Medial joint line tenderness:  Neg   Neg  Lateral joint line tenderness:  +   Neg  Ayana:     Neg   Neg  Patella crepitus:   +   +  Patella tenderness:   Neg   Neg  Patella grind:      +   Neg  Lachman:    Neg   Neg  Pivot shift:    Neg   Neg  Valgus stress:    Neg   Neg  Varus stress:    Neg   Neg  Posterior drawer:   Neg   Neg  N-V               intact  intact  Hip:    nml    nml   Lower extremity edema: Negative negative    Neurovascular exam  - motor function grossly intact bilaterally to hip flexion, knee extension and flexion, ankle dorsiflexion and plantarflexion  - sensation intact to light touch bilaterally to femoral, tibial, tibial and peroneal distributions  - symmetrical pedal pulses    Imaging:   XR Results:  Results for orders placed during the hospital encounter of 09/19/22    X-ray Knee Ortho Right with Flexion    Narrative  EXAMINATION:  XR KNEE ORTHO RIGHT WITH FLEXION    INDICATION:  Pain in right knee    COMPARISON:  Knee radiographs from 06/04/2020    FINDINGS:  AP, PA flexion, sunrise of both knees are obtained with a lateral view of the right knee.  There is no fracture.  Alignment is anatomic.  There are minimal degenerative changes of all 3 compartments of both knees manifesting as nonuniform joint space narrowing and small marginal osteophyte formation.  There is slight lateral subluxation of both patella.  No joint effusion is present the right.  No chondrocalcinosis.    Impression  1. Minimal degenerative changes bilaterally.      Electronically signed  by: Zana Peñaloza MD  Date:    09/19/2022  Time:    14:16      MRI Results:  Results for orders placed during the hospital encounter of 11/28/22    MRI Knee Without Contrast Right    Narrative  EXAMINATION:  MRI KNEE WITHOUT CONTRAST RIGHT    CLINICAL HISTORY:  Knee pain, chronic, negative xray (Age >= 5y);Patellofemoral disorders, right knee    TECHNIQUE:  Multiplanar, multisequence images were performed about the right knee.  No contrast was administered.    COMPARISON:  Right knee x-ray, 09/19/2022    FINDINGS:  The ACL, PCL, lateral collateral complex, popliteus tendon, medial collateral ligament, medial meniscus, and lateral meniscus are intact.  Small joint effusion.    Slight lateral subluxation of the patella.  There is patellar chondromalacia with irregular chondral thinning seen in the median patellar ridge and throughout the lateral facet of the patella.  Tiny full-thickness chondral fissures are seen in the lateral facet with small foci of underlying subchondral marrow edema.    No fracture or avascular necrosis or acute osteochondral lesion.    Impression  1. Slight lateral subluxation of the patella with patellar chondromalacia as detailed above.  2. Small joint effusion.  3. Intact ligaments, tendons, and menisci.      Electronically signed by: Yahir Crowder MD  Date:    11/29/2022  Time:    08:44        Physician Read: I agree with the above impression,  lateral tracking patella with patellar chondromalacia, small joint effusion    Assessment/Plan:   Assessment:  Racquel Machado is a 32 y.o. female with  chondromalacia of right patella    Plan:    Discussed diagnosis and treatment options with patient today.  Her history, physical exam, and imaging is consistent with chondromalacia of her right patella  Discussed operative versus non-operative treatment options with the patient today. Non-operative treatment options to include rest, activity modification, weight loss, anti-inflammatories,  physical therapy, patellar tracking brace. Operative treatment options to include a tibial tubercle osteotomy.  She would like to proceed with non-operative treatment at this time  At last visit we fitted her for a knee brace, due to her insurance she received a compression knee brace. At this time she has had a change in insurance, I recommend we fit her for a patellar tracking brace to aid  with her lateral tracking patella  Under the direction of Sara Darnell PA-C, 15 minutes were spent sizing, fitting, and educating for durable medical equipment application today by Sara Darnell PA-C.  CPT 69870.  At last visit we also referred her to physical therapy at [facility].  She has not been able to attend due to work constraints. Again, she had a change in insurance so we can now move her physical therapy to a more convenient location for her  External physical therapy referral placed to [facility] to work on strengthening of her hip external rotators and strengthening of her quads  Discussed in detail with the patient that I do believe her symptoms will improve if she actively attends physical therapy.   I also recommend she begin working on weight loss as I do believe this would offload some of the pressure on her patella, patient voiced understanding  Follow-up with me as needed         Sara Darnell PA-C  Sports Medicine Physician Assistant       Disclaimer: This note was prepared using a voice recognition system and is likely to have sound alike errors within the text.

## 2023-03-01 NOTE — TELEPHONE ENCOUNTER
Physical therapy referral faxed to [facility] at [phone] with successful fax confirmation email receipt.

## 2023-06-16 DIAGNOSIS — Z00.00 LABORATORY EXAM ORDERED AS PART OF ROUTINE GENERAL MEDICAL EXAMINATION: Primary | ICD-10-CM

## 2023-06-16 DIAGNOSIS — E55.9 VITAMIN D DEFICIENCY: ICD-10-CM

## 2023-06-21 ENCOUNTER — OFFICE VISIT (OUTPATIENT)
Dept: PRIMARY CARE CLINIC | Facility: CLINIC | Age: 33
End: 2023-06-21
Payer: COMMERCIAL

## 2023-06-21 VITALS
HEART RATE: 71 BPM | HEIGHT: 67 IN | OXYGEN SATURATION: 98 % | SYSTOLIC BLOOD PRESSURE: 130 MMHG | TEMPERATURE: 98 F | WEIGHT: 235.88 LBS | DIASTOLIC BLOOD PRESSURE: 72 MMHG | BODY MASS INDEX: 37.02 KG/M2

## 2023-06-21 DIAGNOSIS — Z90.3 H/O GASTRIC SLEEVE: ICD-10-CM

## 2023-06-21 DIAGNOSIS — F41.9 ANXIETY: ICD-10-CM

## 2023-06-21 DIAGNOSIS — Z76.89 ESTABLISHING CARE WITH NEW DOCTOR, ENCOUNTER FOR: ICD-10-CM

## 2023-06-21 DIAGNOSIS — Z00.00 ANNUAL PHYSICAL EXAM: Primary | ICD-10-CM

## 2023-06-21 DIAGNOSIS — F51.04 PSYCHOPHYSIOLOGIC INSOMNIA: ICD-10-CM

## 2023-06-21 PROBLEM — M62.81 PROXIMAL MUSCLE WEAKNESS: Status: RESOLVED | Noted: 2020-06-18 | Resolved: 2023-06-21

## 2023-06-21 PROCEDURE — 1160F PR REVIEW ALL MEDS BY PRESCRIBER/CLIN PHARMACIST DOCUMENTED: ICD-10-PCS | Mod: CPTII,S$GLB,, | Performed by: INTERNAL MEDICINE

## 2023-06-21 PROCEDURE — 99385 PREV VISIT NEW AGE 18-39: CPT | Mod: 1E,GY,S$GLB, | Performed by: INTERNAL MEDICINE

## 2023-06-21 PROCEDURE — 3008F PR BODY MASS INDEX (BMI) DOCUMENTED: ICD-10-PCS | Mod: CPTII,S$GLB,, | Performed by: INTERNAL MEDICINE

## 2023-06-21 PROCEDURE — 3008F BODY MASS INDEX DOCD: CPT | Mod: CPTII,S$GLB,, | Performed by: INTERNAL MEDICINE

## 2023-06-21 PROCEDURE — 99999 PR PBB SHADOW E&M-EST. PATIENT-LVL III: ICD-10-PCS | Mod: PBBFAC,,, | Performed by: INTERNAL MEDICINE

## 2023-06-21 PROCEDURE — 99385 PR PREVENTIVE VISIT,NEW,18-39: ICD-10-PCS | Mod: 1E,GY,S$GLB, | Performed by: INTERNAL MEDICINE

## 2023-06-21 PROCEDURE — 99203 OFFICE O/P NEW LOW 30 MIN: CPT | Mod: 25,S$GLB,, | Performed by: INTERNAL MEDICINE

## 2023-06-21 PROCEDURE — 3078F DIAST BP <80 MM HG: CPT | Mod: CPTII,S$GLB,, | Performed by: INTERNAL MEDICINE

## 2023-06-21 PROCEDURE — 3075F SYST BP GE 130 - 139MM HG: CPT | Mod: CPTII,S$GLB,, | Performed by: INTERNAL MEDICINE

## 2023-06-21 PROCEDURE — 1159F PR MEDICATION LIST DOCUMENTED IN MEDICAL RECORD: ICD-10-PCS | Mod: CPTII,S$GLB,, | Performed by: INTERNAL MEDICINE

## 2023-06-21 PROCEDURE — 1159F MED LIST DOCD IN RCRD: CPT | Mod: CPTII,S$GLB,, | Performed by: INTERNAL MEDICINE

## 2023-06-21 PROCEDURE — 99203 PR OFFICE/OUTPT VISIT, NEW, LEVL III, 30-44 MIN: ICD-10-PCS | Mod: 25,S$GLB,, | Performed by: INTERNAL MEDICINE

## 2023-06-21 PROCEDURE — 3078F PR MOST RECENT DIASTOLIC BLOOD PRESSURE < 80 MM HG: ICD-10-PCS | Mod: CPTII,S$GLB,, | Performed by: INTERNAL MEDICINE

## 2023-06-21 PROCEDURE — 1160F RVW MEDS BY RX/DR IN RCRD: CPT | Mod: CPTII,S$GLB,, | Performed by: INTERNAL MEDICINE

## 2023-06-21 PROCEDURE — 99999 PR PBB SHADOW E&M-EST. PATIENT-LVL III: CPT | Mod: PBBFAC,,, | Performed by: INTERNAL MEDICINE

## 2023-06-21 PROCEDURE — 3075F PR MOST RECENT SYSTOLIC BLOOD PRESS GE 130-139MM HG: ICD-10-PCS | Mod: CPTII,S$GLB,, | Performed by: INTERNAL MEDICINE

## 2023-06-21 RX ORDER — TRAZODONE HYDROCHLORIDE 100 MG/1
100 TABLET ORAL NIGHTLY
Qty: 30 TABLET | Refills: 11 | Status: SHIPPED | OUTPATIENT
Start: 2023-06-21

## 2023-06-21 RX ORDER — DULOXETIN HYDROCHLORIDE 30 MG/1
30 CAPSULE, DELAYED RELEASE ORAL EVERY MORNING
COMMUNITY
Start: 2023-05-02

## 2023-06-21 RX ORDER — FLUTICASONE PROPIONATE 50 MCG
1 SPRAY, SUSPENSION (ML) NASAL DAILY
COMMUNITY
Start: 2023-01-10

## 2023-06-21 RX ORDER — SEMAGLUTIDE 0.68 MG/ML
0.5 INJECTION, SOLUTION SUBCUTANEOUS
Qty: 12 ML | Refills: 5 | Status: SHIPPED | OUTPATIENT
Start: 2023-06-21

## 2023-06-21 RX ORDER — ALPRAZOLAM 0.25 MG/1
0.25 TABLET ORAL 3 TIMES DAILY PRN
COMMUNITY
Start: 2023-05-02

## 2023-06-21 NOTE — PROGRESS NOTES
"Subjective     Patient ID: Racquel Machado is a 32 y.o. female.    Chief Complaint: Establish Care      HPI  Here to establish care/check up.  Due for labs.  Reports utd on pap.  Interested in trying Ozempic; was discussing with her previous MD.  Has trouble sleeping, lots of stress, anxiety, psychological insomnia.  This is chronic.  She has tried hydroxyzine and Lunesta and Alprazolam in the past.  Falls asleep, but awakens hours later and then can't go back to sleep; can't turn mind off.  Plays freee softball regularly and eats fairly well.  Has a gym membership and goes but her fatigue due to insomnia stops her from being regular.  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  In PT for knee pain she was told was due to her weight.    Past Medical History:   Diagnosis Date    Abnormal Pap smear     colposcopy-- normal since    Anemia     Ectopic pregnancy      Review of patient's allergies indicates:  No Known Allergies  Past Surgical History:   Procedure Laterality Date    ABSCESS DRAINAGE  2016    left breast     SECTION, LOW TRANSVERSE  2015    CHOLECYSTECTOMY      ECTOPIC PREGNANCY SURGERY  3/3/2014    Laparotomy for left cornual pregnancy     gastric sleeve  2017     Family History   Problem Relation Age of Onset    Diabetes Mother     Diabetes Father     Hypertension Father     Prostate cancer Father     No Known Problems Brother     No Known Problems Brother     No Known Problems Brother     No Known Problems Daughter     Breast cancer Neg Hx     Colon cancer Neg Hx     Ovarian cancer Neg Hx     Stroke Neg Hx      Social History     Socioeconomic History    Marital status:    Tobacco Use    Smoking status: Never    Smokeless tobacco: Never   Substance and Sexual Activity    Alcohol use: Yes     Comment: socially    Drug use: No    Sexual activity: Yes     Partners: Male     Birth control/protection: Implant         /72   Pulse 71   Temp 97.5 °F (36.4 °C)   Ht 5' 7" " (1.702 m)   Wt 107 kg (235 lb 14.3 oz)   LMP 05/20/2023 (Approximate)   SpO2 98%   BMI 36.95 kg/m²   Outpatient Medications as of 6/21/2023   Medication Sig Dispense Refill    ALPRAZolam (XANAX) 0.25 MG tablet Take 0.25 mg by mouth 3 (three) times daily as needed.      DULoxetine (CYMBALTA) 30 MG capsule Take 30 mg by mouth every morning.      etonogestrel (NEXPLANON) 68 mg Impl subdermal device 68 mg by Subdermal route.      fluticasone propionate (FLONASE) 50 mcg/actuation nasal spray 1 spray by Each Nostril route once daily.      meloxicam (MOBIC) 15 MG tablet Take 1 tablet (15 mg total) by mouth once daily. 30 tablet 0    traZODone (DESYREL) 100 MG tablet Take 1 tablet (100 mg total) by mouth every evening. 30 tablet 11     No current facility-administered medications on file as of 6/21/2023.       Review of Systems   All other systems reviewed and are negative.       Objective     Physical Exam  Constitutional:       General: She is not in acute distress.     Appearance: Normal appearance. She is obese. She is not ill-appearing, toxic-appearing or diaphoretic.   HENT:      Head: Normocephalic and atraumatic.      Right Ear: External ear normal.      Left Ear: External ear normal.      Mouth/Throat:      Mouth: Mucous membranes are moist.      Pharynx: Oropharynx is clear. No oropharyngeal exudate.   Eyes:      Extraocular Movements: Extraocular movements intact.      Conjunctiva/sclera: Conjunctivae normal.   Neck:      Vascular: No carotid bruit.   Cardiovascular:      Rate and Rhythm: Normal rate.      Heart sounds: Normal heart sounds. No murmur heard.    No friction rub. No gallop.   Pulmonary:      Effort: Pulmonary effort is normal. No respiratory distress.      Breath sounds: Normal breath sounds. No wheezing or rales.   Abdominal:      General: Bowel sounds are normal. There is no distension.      Palpations: Abdomen is soft. There is no mass.      Tenderness: There is no abdominal tenderness. There  is no guarding.   Musculoskeletal:         General: No swelling.      Cervical back: Neck supple. No tenderness.   Lymphadenopathy:      Head:      Right side of head: No submental, submandibular, posterior auricular or occipital adenopathy.      Left side of head: No submental, submandibular, posterior auricular or occipital adenopathy.      Cervical:      Right cervical: No superficial, deep or posterior cervical adenopathy.     Left cervical: No superficial, deep or posterior cervical adenopathy.      Upper Body:      Right upper body: No supraclavicular adenopathy.      Left upper body: No supraclavicular adenopathy.   Skin:     General: Skin is warm and dry.   Neurological:      General: No focal deficit present.      Mental Status: She is alert.   Psychiatric:         Mood and Affect: Mood normal.         Behavior: Behavior normal.         Thought Content: Thought content normal.          Assessment and Plan     1. Annual physical exam    2. Establishing care with new doctor, encounter for    3. Anxiety  Overview:  On low dose Cymbalta; uses low dose xanax once daily      4. Psychophysiologic insomnia  Overview:  Has tried Lunesta/xanax/Atarax in the past; relaxation techniques; trial of Trazodone    Orders:  -     traZODone (DESYREL) 100 MG tablet; Take 1 tablet (100 mg total) by mouth every evening.  Dispense: 30 tablet; Refill: 11    5. BMI 36.0-36.9,adult  Overview:  Trial of Ozempic; LSM; h/o gastric sleeve but has plateaued    Orders:  -     semaglutide (OZEMPIC) 0.25 mg or 0.5 mg (2 mg/3 mL) pen injector; Inject 0.5 mg into the skin every 7 days.  Dispense: 12 mL; Refill: 5    6. H/O gastric sleeve  -     semaglutide (OZEMPIC) 0.25 mg or 0.5 mg (2 mg/3 mL) pen injector; Inject 0.5 mg into the skin every 7 days.  Dispense: 12 mL; Refill: 5       Utd on pap and Tetanus   Labs ordered    Follow up in about 4 weeks (around 7/19/2023) for Virtual Visit.    Immunization History   Administered Date(s)  Administered    COVID-19, MRNA, LN-S, PF (Pfizer) (Purple Cap) 03/03/2021, 03/24/2021, 12/29/2021    Rho (D) Immune Globulin 02/18/2014     I spent a total of 30 minutes on the day of the visit.This includes face to face time and non-face to face time preparing to see the patient (eg, review of tests), obtaining and/or reviewing separately obtained history, documenting clinical information in the electronic or other health record, independently interpreting results and communicating results to the patient/family/caregiver, or care coordinator.

## 2023-06-27 ENCOUNTER — PATIENT MESSAGE (OUTPATIENT)
Dept: PRIMARY CARE CLINIC | Facility: CLINIC | Age: 33
End: 2023-06-27
Payer: COMMERCIAL

## 2023-07-19 ENCOUNTER — OFFICE VISIT (OUTPATIENT)
Dept: PRIMARY CARE CLINIC | Facility: CLINIC | Age: 33
End: 2023-07-19
Payer: COMMERCIAL

## 2023-07-19 DIAGNOSIS — E66.01 MORBID OBESITY: Primary | ICD-10-CM

## 2023-07-19 DIAGNOSIS — F51.04 PSYCHOPHYSIOLOGIC INSOMNIA: ICD-10-CM

## 2023-07-19 DIAGNOSIS — Z90.3 H/O GASTRIC SLEEVE: ICD-10-CM

## 2023-07-19 DIAGNOSIS — F41.9 ANXIETY: ICD-10-CM

## 2023-07-19 PROCEDURE — 99212 PR OFFICE/OUTPT VISIT, EST, LEVL II, 10-19 MIN: ICD-10-PCS | Mod: 95,,, | Performed by: INTERNAL MEDICINE

## 2023-07-19 PROCEDURE — 99212 OFFICE O/P EST SF 10 MIN: CPT | Mod: 95,,, | Performed by: INTERNAL MEDICINE

## 2023-07-19 RX ORDER — SEMAGLUTIDE 0.5 MG/.5ML
0.5 INJECTION, SOLUTION SUBCUTANEOUS
Qty: 2 ML | Refills: 5 | Status: SHIPPED | OUTPATIENT
Start: 2023-07-19

## 2023-07-19 NOTE — PROGRESS NOTES
The patient location is: [place]   The chief complaint leading to consultation is: f/u    Visit type: audiovisual    Face to Face time with patient:   15 minutes of total time spent on the encounter, which includes face to face time and non-face to face time preparing to see the patient (eg, review of tests), Obtaining and/or reviewing separately obtained history, Documenting clinical information in the electronic or other health record, Independently interpreting results (not separately reported) and communicating results to the patient/family/caregiver, or Care coordination (not separately reported).         Each patient to whom he or she provides medical services by telemedicine is:  (1) informed of the relationship between the physician and patient and the respective role of any other health care provider with respect to management of the patient; and (2) notified that he or she may decline to receive medical services by telemedicine and may withdraw from such care at any time.    Notes:     Subjective     Patient ID: Racquel Machado is a 32 y.o. female.    Chief Complaint: 4 week f/u    HPI  insurance didn't cover ozempic.  Will try wegovy.  Pt reports trazodone has helped with insomnia and doesn't give her a bad taste in her mouth so she is pleased.  Overall, doing well.  No new concerns.  Plans to get labs done soon.    Past Medical History:   Diagnosis Date    Abnormal Pap smear     colposcopy-- normal since    Anemia     Ectopic pregnancy      Review of patient's allergies indicates:  No Known Allergies  Past Surgical History:   Procedure Laterality Date    ABSCESS DRAINAGE  2016    left breast     SECTION, LOW TRANSVERSE  2015    CHOLECYSTECTOMY      ECTOPIC PREGNANCY SURGERY  3/3/2014    Laparotomy for left cornual pregnancy     gastric sleeve  2017     Family History   Problem Relation Age of Onset    Diabetes Mother     Diabetes Father     Hypertension Father     Prostate  cancer Father     No Known Problems Brother     No Known Problems Brother     No Known Problems Brother     No Known Problems Daughter     Breast cancer Neg Hx     Colon cancer Neg Hx     Ovarian cancer Neg Hx     Stroke Neg Hx      Social History     Socioeconomic History    Marital status:    Tobacco Use    Smoking status: Never    Smokeless tobacco: Never   Substance and Sexual Activity    Alcohol use: Yes     Comment: socially    Drug use: No    Sexual activity: Yes     Partners: Male     Birth control/protection: Implant         LMP 05/20/2023 (Approximate)   Outpatient Medications as of 7/19/2023   Medication Sig Dispense Refill    ALPRAZolam (XANAX) 0.25 MG tablet Take 0.25 mg by mouth 3 (three) times daily as needed.      DULoxetine (CYMBALTA) 30 MG capsule Take 30 mg by mouth every morning.      etonogestrel (NEXPLANON) 68 mg Impl subdermal device 68 mg by Subdermal route.      fluticasone propionate (FLONASE) 50 mcg/actuation nasal spray 1 spray by Each Nostril route once daily.      meloxicam (MOBIC) 15 MG tablet Take 1 tablet (15 mg total) by mouth once daily. 30 tablet 0    semaglutide (OZEMPIC) 0.25 mg or 0.5 mg (2 mg/3 mL) pen injector Inject 0.5 mg into the skin every 7 days. 12 mL 5    traZODone (DESYREL) 100 MG tablet Take 1 tablet (100 mg total) by mouth every evening. 30 tablet 11     No current facility-administered medications on file as of 7/19/2023.       Review of Systems   Constitutional:  Positive for unexpected weight change. Negative for activity change.   HENT:  Negative for hearing loss, rhinorrhea and trouble swallowing.    Eyes:  Negative for discharge and visual disturbance.   Respiratory:  Negative for chest tightness and wheezing.    Cardiovascular:  Negative for chest pain and palpitations.   Gastrointestinal:  Negative for blood in stool, constipation, diarrhea and vomiting.   Endocrine: Positive for polydipsia. Negative for polyuria.   Genitourinary:  Negative for  difficulty urinating, dysuria, hematuria and menstrual problem.   Musculoskeletal:  Negative for arthralgias, joint swelling and neck pain.   Neurological:  Negative for weakness and headaches.   Psychiatric/Behavioral:  Negative for confusion.    All other systems reviewed and are negative.       Objective     Physical Exam  Constitutional:       Comments: Pleasant  Speaking in complete sentences   Psychiatric:         Mood and Affect: Mood normal.          Assessment and Plan     1. Morbid obesity  -     semaglutide, weight loss, (WEGOVY) 0.5 mg/0.5 mL PnIj; Inject 0.5 mg into the skin every 7 days.  Dispense: 2 mL; Refill: 5    2. H/O gastric sleeve  -     semaglutide, weight loss, (WEGOVY) 0.5 mg/0.5 mL PnIj; Inject 0.5 mg into the skin every 7 days.  Dispense: 2 mL; Refill: 5    3. Psychophysiologic insomnia  Overview:  Has tried Lunesta/xanax/Atarax in the past; relaxation techniques; trial of Trazodone      4. Anxiety  Overview:  On low dose Cymbalta; uses low dose xanax once daily           Labs  Rtc approx 4 weeks    Immunization History   Administered Date(s) Administered    COVID-19, MRNA, LN-S, PF (Pfizer) (Purple Cap) 03/03/2021, 03/24/2021, 12/29/2021    Rho (D) Immune Globulin 02/18/2014

## 2023-07-21 ENCOUNTER — PATIENT MESSAGE (OUTPATIENT)
Dept: PRIMARY CARE CLINIC | Facility: CLINIC | Age: 33
End: 2023-07-21
Payer: COMMERCIAL

## 2023-07-24 ENCOUNTER — PATIENT MESSAGE (OUTPATIENT)
Dept: RESEARCH | Facility: HOSPITAL | Age: 33
End: 2023-07-24
Payer: COMMERCIAL

## 2023-10-23 ENCOUNTER — TELEPHONE (OUTPATIENT)
Dept: OBSTETRICS AND GYNECOLOGY | Facility: CLINIC | Age: 33
End: 2023-10-23
Payer: MEDICAID

## 2023-10-23 NOTE — TELEPHONE ENCOUNTER
Called patient to schedule her annual exam. No answer, left voicemail to call us or to reach us through [employer].

## 2023-11-02 ENCOUNTER — OFFICE VISIT (OUTPATIENT)
Dept: OBSTETRICS AND GYNECOLOGY | Facility: CLINIC | Age: 33
End: 2023-11-02
Payer: MEDICAID

## 2023-11-02 VITALS
HEIGHT: 67 IN | WEIGHT: 231.69 LBS | BODY MASS INDEX: 36.36 KG/M2 | SYSTOLIC BLOOD PRESSURE: 122 MMHG | DIASTOLIC BLOOD PRESSURE: 80 MMHG

## 2023-11-02 DIAGNOSIS — Z11.3 SCREEN FOR STD (SEXUALLY TRANSMITTED DISEASE): ICD-10-CM

## 2023-11-02 DIAGNOSIS — Z01.419 ROUTINE GYNECOLOGICAL EXAMINATION: Primary | ICD-10-CM

## 2023-11-02 DIAGNOSIS — Z30.46 NEXPLANON REMOVAL: ICD-10-CM

## 2023-11-02 DIAGNOSIS — Z12.4 PAPANICOLAOU SMEAR FOR CERVICAL CANCER SCREENING: ICD-10-CM

## 2023-11-02 PROCEDURE — 87624 HPV HI-RISK TYP POOLED RSLT: CPT | Performed by: NURSE PRACTITIONER

## 2023-11-02 PROCEDURE — 87491 CHLMYD TRACH DNA AMP PROBE: CPT | Performed by: NURSE PRACTITIONER

## 2023-11-02 PROCEDURE — 3079F PR MOST RECENT DIASTOLIC BLOOD PRESSURE 80-89 MM HG: ICD-10-PCS | Mod: CPTII,,, | Performed by: NURSE PRACTITIONER

## 2023-11-02 PROCEDURE — 3074F SYST BP LT 130 MM HG: CPT | Mod: CPTII,,, | Performed by: NURSE PRACTITIONER

## 2023-11-02 PROCEDURE — 1159F MED LIST DOCD IN RCRD: CPT | Mod: CPTII,,, | Performed by: NURSE PRACTITIONER

## 2023-11-02 PROCEDURE — 99213 OFFICE O/P EST LOW 20 MIN: CPT | Mod: PBBFAC | Performed by: NURSE PRACTITIONER

## 2023-11-02 PROCEDURE — 1160F PR REVIEW ALL MEDS BY PRESCRIBER/CLIN PHARMACIST DOCUMENTED: ICD-10-PCS | Mod: CPTII,,, | Performed by: NURSE PRACTITIONER

## 2023-11-02 PROCEDURE — 3008F PR BODY MASS INDEX (BMI) DOCUMENTED: ICD-10-PCS | Mod: CPTII,,, | Performed by: NURSE PRACTITIONER

## 2023-11-02 PROCEDURE — 3074F PR MOST RECENT SYSTOLIC BLOOD PRESSURE < 130 MM HG: ICD-10-PCS | Mod: CPTII,,, | Performed by: NURSE PRACTITIONER

## 2023-11-02 PROCEDURE — 11982 REMOVAL OF NEXPLANON DEVICE: ICD-10-PCS | Mod: S$PBB,,, | Performed by: NURSE PRACTITIONER

## 2023-11-02 PROCEDURE — 99395 PREV VISIT EST AGE 18-39: CPT | Mod: 25,S$PBB,, | Performed by: NURSE PRACTITIONER

## 2023-11-02 PROCEDURE — 3008F BODY MASS INDEX DOCD: CPT | Mod: CPTII,,, | Performed by: NURSE PRACTITIONER

## 2023-11-02 PROCEDURE — 11982 REMOVE DRUG IMPLANT DEVICE: CPT | Mod: PBBFAC | Performed by: NURSE PRACTITIONER

## 2023-11-02 PROCEDURE — 3079F DIAST BP 80-89 MM HG: CPT | Mod: CPTII,,, | Performed by: NURSE PRACTITIONER

## 2023-11-02 PROCEDURE — 1159F PR MEDICATION LIST DOCUMENTED IN MEDICAL RECORD: ICD-10-PCS | Mod: CPTII,,, | Performed by: NURSE PRACTITIONER

## 2023-11-02 PROCEDURE — 99999 PR PBB SHADOW E&M-EST. PATIENT-LVL III: CPT | Mod: PBBFAC,,, | Performed by: NURSE PRACTITIONER

## 2023-11-02 PROCEDURE — 88142 CYTOPATH C/V THIN LAYER: CPT | Performed by: NURSE PRACTITIONER

## 2023-11-02 PROCEDURE — 1160F RVW MEDS BY RX/DR IN RCRD: CPT | Mod: CPTII,,, | Performed by: NURSE PRACTITIONER

## 2023-11-02 PROCEDURE — 11982 REMOVE DRUG IMPLANT DEVICE: CPT | Mod: S$PBB,,, | Performed by: NURSE PRACTITIONER

## 2023-11-02 PROCEDURE — 99999 PR PBB SHADOW E&M-EST. PATIENT-LVL III: ICD-10-PCS | Mod: PBBFAC,,, | Performed by: NURSE PRACTITIONER

## 2023-11-02 PROCEDURE — 99395 PR PREVENTIVE VISIT,EST,18-39: ICD-10-PCS | Mod: 25,S$PBB,, | Performed by: NURSE PRACTITIONER

## 2023-11-02 NOTE — PROGRESS NOTES
"  Subjective:       Patient ID: Racquel Machado is a 33 y.o. female.    Chief Complaint:  Annual Exam, Well Woman, and Nexplanon Removal      History of Present Illness  HPI    Health Maintenance   Topic Date Due    Hepatitis C Screening  Never done    TETANUS VACCINE  Never done    Lipid Panel  Completed     GYN & OB History  Patient's last menstrual period was 10/02/2023 (approximate).   Date of Last Pap: No result found    OB History    Para Term  AB Living   2 1 1 0 1 1   SAB IAB Ectopic Multiple Live Births   0 0 1 0 2      # Outcome Date GA Lbr Jovany/2nd Weight Sex Delivery Anes PTL Lv   2 Term 01/31/15    F CS-LTranv   HAL   1 Ectopic 14 4w0d      N DEC       Review of Systems  Review of Systems        Objective:   /80   Ht 5' 7" (1.702 m)   Wt 105.1 kg (231 lb 11.3 oz)   LMP 10/02/2023 (Approximate)   BMI 36.29 kg/m²    Physical Exam:   Constitutional: She is oriented to person, place, and time. She appears well-developed and well-nourished.        Pulmonary/Chest: Right breast exhibits no inverted nipple, no mass, no nipple discharge, no skin change, no tenderness and no swelling. Left breast exhibits no inverted nipple, no mass, no nipple discharge, no skin change, no tenderness and no swelling.        Abdominal: Soft.     Genitourinary:    Inguinal canal and vagina normal.      Pelvic exam was performed with patient supine.   The external female genitalia was normal.   Genitalia hair distrobution normal .   Labial bartholins normal.Cervix is normal. No erythema,  no vaginal discharge, bleeding, rectocele, cystocele or unspecified prolapse of vaginal walls in the vagina.    No foreign body in the vagina.      No signs of injury in the vagina.      pap smear completed              Neurological: She is alert and oriented to person, place, and time.    Skin: Skin is warm and dry.    Psychiatric: She has a normal mood and affect. Her behavior is normal. Judgment and thought " content normal.      Assessment:        1. Routine gynecological examination    2. Nexplanon removal    3. Papanicolaou smear for cervical cancer screening    4. Screen for STD (sexually transmitted disease)                Plan:            Racquel was seen today for annual exam, well woman and nexplanon removal.    Diagnoses and all orders for this visit:    Routine gynecological examination    Nexplanon removal  -     Removal of Nexplanon Device    Papanicolaou smear for cervical cancer screening  -     Liquid-Based Pap Smear, Screening  -     HPV High Risk Genotypes, PCR    Screen for STD (sexually transmitted disease)  -     C. trachomatis/N. gonorrhoeae by AMP DNA [facility]; Vagina  -     HIV 1/2 Ag/Ab (4th Gen); Future  -     RPR; Future  -     Hepatitis Panel, Acute; Future      Return to clinic in one year for wwe

## 2023-11-02 NOTE — PROCEDURES
Removal of Nexplanon Device    Date/Time: 11/2/2023 3:45 PM    Performed by: Candida Mock NP  Authorized by: Candida Mock NP    Consent obtained:  Verbal  Consent given by:  Patient  Procedure risks and benefits discussed: yes    Patient questions answered: yes    Patient agrees, verbalizes understanding, and wants to proceed: yes    Educational handouts given: yes    Instructions and paperwork completed: yes    Implant grasped by: hemostat  Removal due to infection and inflammatory reaction: no    Other reason for removal:  Seeking pregnancy  Removal due to mechanical complications of IUD/Nexplanon: no    Removed with no complications: yes     Seeking pregnancy no  Arm: left  Palpation confirms location: yes  Small stab incision was made in arm: yes  Upon removal device was intact: yes  Site was close with steri-strips and pressure bandage applied: yes  Pre-procedure timeout performed: yes  Prepped with:  povidone-iodine 7.5% surgical scrub and alcohol 70%  Local anesthetic:  Lidocaine with epinephrine   The site was cleaned  and prepped in a sterile fashion: yes  Specimen sent to pathology: Yes

## 2023-11-03 LAB
C TRACH DNA SPEC QL NAA+PROBE: NOT DETECTED
N GONORRHOEA DNA SPEC QL NAA+PROBE: NOT DETECTED

## 2023-11-07 LAB
HPV HR 12 DNA SPEC QL NAA+PROBE: NEGATIVE
HPV16 AG SPEC QL: NEGATIVE
HPV18 DNA SPEC QL NAA+PROBE: NEGATIVE

## 2023-11-10 LAB
FINAL PATHOLOGIC DIAGNOSIS: NORMAL
Lab: NORMAL

## 2024-12-09 ENCOUNTER — TELEPHONE (OUTPATIENT)
Dept: SPORTS MEDICINE | Facility: CLINIC | Age: 34
End: 2024-12-09
Payer: COMMERCIAL

## 2024-12-12 DIAGNOSIS — M25.561 RIGHT KNEE PAIN, UNSPECIFIED CHRONICITY: Primary | ICD-10-CM

## 2024-12-13 ENCOUNTER — HOSPITAL ENCOUNTER (OUTPATIENT)
Dept: RADIOLOGY | Facility: HOSPITAL | Age: 34
Discharge: HOME | End: 2024-12-13
Attending: PHYSICIAN ASSISTANT
Payer: COMMERCIAL

## 2024-12-13 ENCOUNTER — OFFICE VISIT (OUTPATIENT)
Dept: SPORTS MEDICINE | Facility: CLINIC | Age: 34
End: 2024-12-13
Payer: COMMERCIAL

## 2024-12-13 VITALS — HEIGHT: 67 IN | WEIGHT: 231.69 LBS | BODY MASS INDEX: 36.36 KG/M2

## 2024-12-13 DIAGNOSIS — M22.41 CHONDROMALACIA OF RIGHT PATELLA: ICD-10-CM

## 2024-12-13 DIAGNOSIS — M25.561 RIGHT KNEE PAIN, UNSPECIFIED CHRONICITY: ICD-10-CM

## 2024-12-13 DIAGNOSIS — S80.11XA HEMATOMA OF RIGHT LOWER EXTREMITY, INITIAL ENCOUNTER: Primary | ICD-10-CM

## 2024-12-13 PROCEDURE — 99999 PR PBB SHADOW E&M-EST. PATIENT-LVL IV: CPT | Mod: PBBFAC,,, | Performed by: PHYSICIAN ASSISTANT

## 2024-12-13 PROCEDURE — 73564 X-RAY EXAM KNEE 4 OR MORE: CPT | Mod: TC,PN,RT

## 2024-12-13 PROCEDURE — 73560 X-RAY EXAM OF KNEE 1 OR 2: CPT | Mod: 26,RT,, | Performed by: RADIOLOGY

## 2024-12-13 RX ORDER — NAPROXEN 500 MG/1
500 TABLET ORAL 2 TIMES DAILY WITH MEALS
Qty: 60 TABLET | Refills: 0 | Status: SHIPPED | OUTPATIENT
Start: 2024-12-13

## 2024-12-13 NOTE — PROGRESS NOTES
Orthopaedic Follow-Up Visit    Last Appointment:  03/01/2023  Diagnosis:  Chondromalacia of right patella, chronic right knee pain  Prior Procedure:  Formal physical therapy    History of Present Illness  (new injury)  CHIEF COMPLAINT:  - Racquel presents today for evaluation of a right knee injury sustained while playing softball approximately three weeks ago.    HPI:  Racquel presents for evaluation of a right knee injury that occurred approximately three weeks ago while playing softball. She reports being hit by a softball in a specific area of her knee. She initially thought it was just a bruise, but noticed it hardened over time, prompting her to seek medical attention. She describes the area as feeling different compared to the unaffected side.    She has been playing through the injury, continuing to participate in softball games, including one last week. Daily activities are generally fine, but she experiences discomfort when actively using the knee for extended periods. She has been taking Tylenol for pain relief but has not used any other medications or treatments. She denies using ice on the affected area.    WORK STATUS:  - Works from home currently  - Previously had to go into the office, which made scheduling physical therapy appointments difficult      ROS:  10-point ROS is negative unless otherwise indicated in the HPI.       Past Medical History:   Diagnosis Date    Abnormal Pap smear 2012    colposcopy-- normal since    Anemia     Ectopic pregnancy        Objective:      Physical Exam  Patient is alert and oriented, no distress. Skin is intact. Neuro is normal with no focal motor or sensory findings.    Standing exam  stance: normal alignment, no significant leg-length discrepancy  gait: no limp      Knee      RIGHT  LEFT  Skin:     Intact   Intact  ROM:     0-130  0-130  Effusion:    Neg   Neg  Medial joint line tenderness:  Neg   Neg  Lateral joint line tenderness:  +   Neg  Ayana:     Neg    Neg  Patella crepitus:   +   +  Patella tenderness:   Neg   Neg  Patella grind:      +   Neg  Lachman:    Neg   Neg  Valgus stress:    Neg   Neg  Varus stress:    Neg   Neg  Posterior drawer:   Neg   Neg  N-V               intact  intact  Hip:    nml    nml   Lower extremity edema: Negative Negative    There is an area on the lateral aspect of her right knee that is consistent with prior hematoma that has now hardened.  She is tender to palpation of the area.  Her knee exam is normal.  She primarily has pain with the said area is palpated.    Neurovascular exam  - motor function grossly intact bilaterally to hip flexion, knee extension and flexion, ankle dorsiflexion and plantarflexion  - sensation intact to light touch bilaterally to femoral, tibial, tibial and peroneal distributions  - symmetrical pedal pulses    Imaging:   XR Results:  EXAM: XR KNEE ORTHO RIGHT WITH FLEXION     CLINICAL HISTORY: Pain     FINDINGS:  Compared to 06/04/2020.     No fracture, dislocation or joint effusion is identified.  Joint spaces are maintained.        Impression:   No evidence of acute injury or significant degenerative changes.     Finalized on: 12/13/2024 11:32 AM By:  Kvng Grant  BRR# [id number]      2024-12-13 11:34:[id number]    BRR      MRI Results:  Results for orders placed during the hospital encounter of 11/28/22    MRI Knee Without Contrast Right    Narrative  EXAMINATION:  MRI KNEE WITHOUT CONTRAST RIGHT    CLINICAL HISTORY:  Knee pain, chronic, negative xray (Age >= 5y);Patellofemoral disorders, right knee    TECHNIQUE:  Multiplanar, multisequence images were performed about the right knee.  No contrast was administered.    COMPARISON:  Right knee x-ray, 09/19/2022    FINDINGS:  The ACL, PCL, lateral collateral complex, popliteus tendon, medial collateral ligament, medial meniscus, and lateral meniscus are intact.  Small joint effusion.    Slight lateral subluxation of the patella.  There is patellar  chondromalacia with irregular chondral thinning seen in the median patellar ridge and throughout the lateral facet of the patella.  Tiny full-thickness chondral fissures are seen in the lateral facet with small foci of underlying subchondral marrow edema.    No fracture or avascular necrosis or acute osteochondral lesion.    Impression  1. Slight lateral subluxation of the patella with patellar chondromalacia as detailed above.  2. Small joint effusion.  3. Intact ligaments, tendons, and menisci.      Electronically signed by: Yahir Crowder MD  Date:    11/29/2022  Time:    08:44      Physician Read: I agree with the above impression.    Assessment/Plan:   Assessment:  Racquel Machado is a 34 y.o. female with right lower extremity hematoma, patellar chondromalacia      Assessment & Plan    MEDICATIONS PRESCRIBED:   Prescribed naproxen to be taken twice daily for 30 days to prevent heterotopic ossification and help absorb the hematoma quicker.    PROCEDURES:   Provided a compression brace to help with swelling and absorption of the hematoma.   Under the direction of Sara Darnell PA-C, 15 minutes were spent sizing, fitting, and educating for durable medical equipment application today by Sara Darnell PA-C.  CPT 80300.    IMAGING ORDERS:   new x-rays were taken of the knee today, revealed no acute fractures or dislocations.  X-rays look similar to prior x-rays 2 years ago.    REFERRALS:   Referred to physical therapy at [facility] for soft tissue modalities which includes manual therapy, and potential dry needling to address the knee injury.    LIFESTYLE:   Recommend wearing a compression brace, specifically a squeezing one, to help with swelling and aid in quicker absorption of the hematoma.   Advised continuing exercises previously taught in physical therapy to keep the knee strong.   Suggested icing the affected area.    FOLLOW UP:   Follow up in about a month to reassess the knee condition.   The pain is persistent at that time we will obtain an MRI        Sara Darnell PA-C  Sports Medicine Physician Assistant       Disclaimer: This note was prepared using a voice recognition system and is likely to have sound alike errors within the text.    This note was generated with the assistance of ambient listening technology. Verbal consent was obtained by the patient and accompanying visitor(s) for the recording of patient appointment to facilitate this note. I attest to having reviewed and edited the generated note for accuracy, though some syntax or spelling errors may persist. Please contact the author of this note for any clarification.

## 2025-01-10 DIAGNOSIS — M22.41 CHONDROMALACIA OF RIGHT PATELLA: ICD-10-CM

## 2025-01-10 DIAGNOSIS — S80.11XA HEMATOMA OF RIGHT LOWER EXTREMITY, INITIAL ENCOUNTER: ICD-10-CM

## 2025-01-10 RX ORDER — NAPROXEN 500 MG/1
500 TABLET ORAL 2 TIMES DAILY WITH MEALS
Qty: 60 TABLET | Refills: 0 | Status: SHIPPED | OUTPATIENT
Start: 2025-01-10

## 2025-01-13 ENCOUNTER — OFFICE VISIT (OUTPATIENT)
Dept: PRIMARY CARE CLINIC | Facility: CLINIC | Age: 35
End: 2025-01-13
Payer: COMMERCIAL

## 2025-01-13 VITALS
DIASTOLIC BLOOD PRESSURE: 82 MMHG | TEMPERATURE: 98 F | BODY MASS INDEX: 32.51 KG/M2 | HEART RATE: 81 BPM | WEIGHT: 207.56 LBS | OXYGEN SATURATION: 97 % | SYSTOLIC BLOOD PRESSURE: 126 MMHG

## 2025-01-13 DIAGNOSIS — Z71.3 WEIGHT LOSS COUNSELING, ENCOUNTER FOR: ICD-10-CM

## 2025-01-13 DIAGNOSIS — Z90.3 H/O GASTRIC SLEEVE: ICD-10-CM

## 2025-01-13 DIAGNOSIS — Z09 FOLLOW-UP EXAM: Primary | ICD-10-CM

## 2025-01-13 PROCEDURE — 99213 OFFICE O/P EST LOW 20 MIN: CPT | Mod: S$GLB,,, | Performed by: INTERNAL MEDICINE

## 2025-01-13 PROCEDURE — 3079F DIAST BP 80-89 MM HG: CPT | Mod: CPTII,S$GLB,, | Performed by: INTERNAL MEDICINE

## 2025-01-13 PROCEDURE — 3008F BODY MASS INDEX DOCD: CPT | Mod: CPTII,S$GLB,, | Performed by: INTERNAL MEDICINE

## 2025-01-13 PROCEDURE — 1160F RVW MEDS BY RX/DR IN RCRD: CPT | Mod: CPTII,S$GLB,, | Performed by: INTERNAL MEDICINE

## 2025-01-13 PROCEDURE — 3074F SYST BP LT 130 MM HG: CPT | Mod: CPTII,S$GLB,, | Performed by: INTERNAL MEDICINE

## 2025-01-13 PROCEDURE — 1159F MED LIST DOCD IN RCRD: CPT | Mod: CPTII,S$GLB,, | Performed by: INTERNAL MEDICINE

## 2025-01-13 PROCEDURE — 99999 PR PBB SHADOW E&M-EST. PATIENT-LVL IV: CPT | Mod: PBBFAC,,, | Performed by: INTERNAL MEDICINE

## 2025-01-13 RX ORDER — TIRZEPATIDE 12.5 MG/.5ML
12.5 INJECTION, SOLUTION SUBCUTANEOUS
COMMUNITY

## 2025-01-13 NOTE — PROGRESS NOTES
Subjective     Patient ID: Racquel Machado is a 34 y.o. female.    Chief Complaint: Weight Loss (Patient is here to discuss weight loss options.)      HPI   here to discuss weight.  H/o trouble with being overweight.  S/p gastric sleeve .  Has tried qsymia and glp-1 at outside locations.  Insurance issues in past with trying to get Wegovy, ozempic and zepbound covered.  Trying to work on LSM as well.  Due to this, we recommend seeing the [facility].    Past Medical History:   Diagnosis Date    Abnormal Pap smear     colposcopy-- normal since    Anemia     Ectopic pregnancy      Review of patient's allergies indicates:  No Known Allergies  Past Surgical History:   Procedure Laterality Date    ABSCESS DRAINAGE  2016    left breast     SECTION, LOW TRANSVERSE  2015    CHOLECYSTECTOMY      ECTOPIC PREGNANCY SURGERY  3/3/2014    Laparotomy for left cornual pregnancy     gastric sleeve  2017     Family History   Problem Relation Name Age of Onset    Diabetes Mother      Diabetes Father      Hypertension Father      Prostate cancer Father      No Known Problems Brother      No Known Problems Brother      No Known Problems Brother      No Known Problems Daughter      Breast cancer Neg Hx      Colon cancer Neg Hx      Ovarian cancer Neg Hx      Stroke Neg Hx      Pancreatic cancer Neg Hx      Thyroid cancer Neg Hx      Cancer Neg Hx          no fam hx of MEN syndromes     Social History     Socioeconomic History    Marital status:    Tobacco Use    Smoking status: Never     Passive exposure: Never    Smokeless tobacco: Never   Substance and Sexual Activity    Alcohol use: Yes     Comment: socially    Drug use: No    Sexual activity: Yes     Partners: Male     Birth control/protection: Implant     Social Drivers of Health     Financial Resource Strain: Low Risk  (2025)    Overall Financial Resource Strain (CARDIA)     Difficulty of Paying Living Expenses: Not  very hard   Food Insecurity: No Food Insecurity (1/13/2025)    Hunger Vital Sign     Worried About Running Out of Food in the Last Year: Never true     Ran Out of Food in the Last Year: Never true   Transportation Needs: No Transportation Needs (2/13/2023)    [facility]    PRAPARE - Transportation     Lack of Transportation (Medical): No     Lack of Transportation (Non-Medical): No   Physical Activity: Insufficiently Active (1/13/2025)    Exercise Vital Sign     Days of Exercise per Week: 2 days     Minutes of Exercise per Session: 30 min   Stress: No Stress Concern Present (1/13/2025)    [employer] - Occupational Stress Questionnaire     Feeling of Stress : Only a little   Housing Stability: Unknown (1/13/2025)    Housing Stability Vital Sign     Unable to Pay for Housing in the Last Year: No         /82 (BP Location: Right arm)   Pulse 81   Temp 98.1 °F (36.7 °C) (Tympanic)   Wt 94.2 kg (207 lb 9 oz)   SpO2 97%   BMI 32.51 kg/m²   Outpatient Medications as of 1/13/2025   Medication Sig Dispense Refill    ALPRAZolam (XANAX) 0.25 MG tablet Take 0.25 mg by mouth 3 (three) times daily as needed.      traZODone (DESYREL) 100 MG tablet Take 1 tablet (100 mg total) by mouth every evening. 30 tablet 11     No current facility-administered medications on file as of 1/13/2025.       Review of Systems   Constitutional:  Positive for activity change and unexpected weight change.   HENT:  Negative for hearing loss, rhinorrhea and trouble swallowing.    Eyes:  Negative for discharge and visual disturbance.   Respiratory:  Negative for chest tightness and wheezing.    Cardiovascular:  Negative for chest pain and palpitations.   Gastrointestinal:  Negative for blood in stool, constipation, diarrhea and vomiting.    Endocrine: Negative for polydipsia and polyuria.   Genitourinary:  Negative for difficulty urinating, dysuria, hematuria and menstrual problem.   Musculoskeletal:  Negative for arthralgias, joint swelling and neck pain.   Neurological:  Negative for weakness and headaches.   Psychiatric/Behavioral:  Negative for confusion and dysphoric mood.    All other systems reviewed and are negative.         Objective     Physical Exam  Constitutional:       Appearance: Normal appearance.   HENT:      Head: Normocephalic and atraumatic.   Cardiovascular:      Rate and Rhythm: Normal rate.   Pulmonary:      Effort: No respiratory distress.   Musculoskeletal:      Cervical back: Neck supple.   Neurological:      Mental Status: She is alert and oriented to person, place, and time.   Psychiatric:         Mood and Affect: Mood normal.         Behavior: Behavior normal.            Assessment and Plan     1. Follow-up exam    2. H/O gastric sleeve 2018    3. BMI 32.0-32.9,adult  -     Ambulatory referral/consult to [facility]; Future; Expected date: 01/20/2025    4. Weight loss counseling, encounter for  -     Ambulatory referral/consult to [facility]; Future; Expected date: 01/20/2025             Immunization History   Administered Date(s) Administered    COVID-19, MRNA, LN-S, PF (Pfizer) (Purple Cap) 03/03/2021, 03/24/2021, 12/29/2021    Rho (D) Immune Globulin 02/18/2014     I spent a total of 20 minutes on the day of the visit.This includes face to face time and non-face to face time preparing to see the patient (eg, review of tests), obtaining and/or reviewing separately obtained history, documenting clinical information in the electronic or other health record, independently interpreting results and communicating results to the patient/family/caregiver, or care coordinator.

## 2025-01-15 ENCOUNTER — PATIENT MESSAGE (OUTPATIENT)
Dept: PRIMARY CARE CLINIC | Facility: CLINIC | Age: 35
End: 2025-01-15
Payer: COMMERCIAL

## 2025-01-27 ENCOUNTER — PATIENT MESSAGE (OUTPATIENT)
Dept: PRIMARY CARE CLINIC | Facility: CLINIC | Age: 35
End: 2025-01-27
Payer: COMMERCIAL

## 2025-01-28 RX ORDER — TIRZEPATIDE 12.5 MG/.5ML
12.5 INJECTION, SOLUTION SUBCUTANEOUS
Qty: 4 PEN | Refills: 1 | Status: CANCELLED | OUTPATIENT
Start: 2025-01-28

## 2025-01-28 NOTE — TELEPHONE ENCOUNTER
No care due was identified.  [facility] Embedded Care Due Messages. Reference number: [id number].   1/28/2025 7:57:53 AM CST

## 2025-02-26 ENCOUNTER — OFFICE VISIT (OUTPATIENT)
Dept: OBSTETRICS AND GYNECOLOGY | Facility: CLINIC | Age: 35
End: 2025-02-26
Payer: COMMERCIAL

## 2025-02-26 ENCOUNTER — LAB VISIT (OUTPATIENT)
Dept: LAB | Facility: HOSPITAL | Age: 35
End: 2025-02-26
Attending: NURSE PRACTITIONER
Payer: COMMERCIAL

## 2025-02-26 VITALS
SYSTOLIC BLOOD PRESSURE: 120 MMHG | BODY MASS INDEX: 31.94 KG/M2 | WEIGHT: 203.94 LBS | DIASTOLIC BLOOD PRESSURE: 74 MMHG

## 2025-02-26 DIAGNOSIS — Z11.3 SCREEN FOR STD (SEXUALLY TRANSMITTED DISEASE): ICD-10-CM

## 2025-02-26 DIAGNOSIS — Z01.419 ROUTINE GYNECOLOGICAL EXAMINATION: Primary | ICD-10-CM

## 2025-02-26 DIAGNOSIS — Z30.9 ENCOUNTER FOR CONTRACEPTIVE MANAGEMENT, UNSPECIFIED TYPE: ICD-10-CM

## 2025-02-26 DIAGNOSIS — Z30.011 BCP (BIRTH CONTROL PILLS) INITIATION: ICD-10-CM

## 2025-02-26 DIAGNOSIS — N92.3 INTERMENSTRUAL BLEEDING: ICD-10-CM

## 2025-02-26 LAB
B-HCG UR QL: NEGATIVE
CTP QC/QA: YES
HAV IGM SERPL QL IA: NORMAL
HBV CORE IGM SERPL QL IA: NORMAL
HBV SURFACE AG SERPL QL IA: NORMAL
HCV AB SERPL QL IA: NORMAL
HIV 1+2 AB+HIV1 P24 AG SERPL QL IA: NORMAL
TREPONEMA PALLIDUM IGG+IGM AB [PRESENCE] IN SERUM OR PLASMA BY IMMUNOASSAY: NONREACTIVE

## 2025-02-26 PROCEDURE — 87591 N.GONORRHOEAE DNA AMP PROB: CPT | Performed by: NURSE PRACTITIONER

## 2025-02-26 PROCEDURE — 80074 ACUTE HEPATITIS PANEL: CPT | Performed by: NURSE PRACTITIONER

## 2025-02-26 PROCEDURE — 36415 COLL VENOUS BLD VENIPUNCTURE: CPT | Performed by: NURSE PRACTITIONER

## 2025-02-26 PROCEDURE — 87389 HIV-1 AG W/HIV-1&-2 AB AG IA: CPT | Performed by: NURSE PRACTITIONER

## 2025-02-26 PROCEDURE — 86593 SYPHILIS TEST NON-TREP QUANT: CPT | Performed by: NURSE PRACTITIONER

## 2025-02-26 RX ORDER — NORETHINDRONE ACETATE AND ETHINYL ESTRADIOL .02; 1 MG/1; MG/1
1 TABLET ORAL DAILY
Qty: 28 TABLET | Refills: 11 | Status: SHIPPED | OUTPATIENT
Start: 2025-02-26 | End: 2025-02-28 | Stop reason: ALTCHOICE

## 2025-02-26 NOTE — PROGRESS NOTES
Subjective:       Patient ID: Racquel Machado is a 34 y.o. female.    Chief Complaint:  Contraception      History of Present Illness  HPI  Over that past 6 months she has been experiencing soptting in between cycles   Her cycles are monthly and last 3 days then two days after her cycle ends she spots for an additional 2 days   Desires hormonal birth control pills to regulate her cycle   Desires std screening   Health Maintenance   Topic Date Due    Influenza Vaccine (1) 2024    COVID-19 Vaccine ( season) 2024    TETANUS VACCINE  2025    Cervical Cancer Screening  2028    RSV Vaccine (Age 60+ and Pregnant patients) (1 - 1-dose 75+ series) 2065    Hepatitis C Screening  Completed    HIV Screening  Completed    Lipid Panel  Completed    Pneumococcal Vaccines (Age 0-49)  Aged Out     GYN & OB History  Patient's last menstrual period was 2025.   Date of Last Pap: 11/10/2023 negative hpv negative     OB History    Para Term  AB Living   2 1 1 0 1 1   SAB IAB Ectopic Multiple Live Births   0 0 1 0 2      # Outcome Date GA Lbr Jovany/2nd Weight Sex Type Anes PTL Lv   2 Term 01/31/15    F CS-LTranv   HAL   1 Ectopic 14 4w0d      N DEC       Review of Systems  Review of Systems        Objective:   /74   Wt 92.5 kg (203 lb 14.8 oz)   LMP 2025   BMI 31.94 kg/m²    Physical Exam.     Assessment:        1. Routine gynecological examination    2. Encounter for contraceptive management, unspecified type    3. Screen for STD (sexually transmitted disease)    4. BCP (birth control pills) initiation                Plan:            Racquel was seen today for contraception.    Diagnoses and all orders for this visit:    Routine gynecological examination    Encounter for contraceptive management, unspecified type  -     POCT Urine Pregnancy    Screen for STD (sexually transmitted disease)  -     C. trachomatis/N. gonorrhoeae by AMP DNA [facility];  Urine; Future  -     HIV 1/2 Ag/Ab (4th Gen); Future  -     Treponema Pallidium Antibodies IgG, IgM; Future  -     Hepatitis Panel, Acute; Future    BCP (birth control pills) initiation  -     norethindrone-ethinyl estradiol (MICROGESTIN 1/20) 1-20 mg-mcg per tablet; Take 1 tablet by mouth once daily.    Return to clinic in one year for WWE

## 2025-02-28 ENCOUNTER — TELEPHONE (OUTPATIENT)
Dept: OBSTETRICS AND GYNECOLOGY | Facility: CLINIC | Age: 35
End: 2025-02-28
Payer: COMMERCIAL

## 2025-02-28 DIAGNOSIS — Z30.9 ENCOUNTER FOR CONTRACEPTIVE MANAGEMENT, UNSPECIFIED TYPE: Primary | ICD-10-CM

## 2025-02-28 RX ORDER — NORETHINDRONE ACETATE AND ETHINYL ESTRADIOL, ETHINYL ESTRADIOL AND FERROUS FUMARATE 1MG-10(24)
1 KIT ORAL DAILY
Qty: 26 TABLET | Refills: 11 | Status: SHIPPED | OUTPATIENT
Start: 2025-02-28

## 2025-02-28 NOTE — TELEPHONE ENCOUNTER
This is a patient of MsPavan Tirado's.  Pt called stating that thhe Birth Control rx that was written for her is for 28days. But pt states she thought she was being prescribed Loestrin.  Message sent to [facility] for Loestrin rx.     Graciela GOTTLIEB LPN  OB/GYN

## 2025-02-28 NOTE — TELEPHONE ENCOUNTER
----- Message from Nurse Harden sent at 2/28/2025 11:06 AM CST -----  Rudi Mittal is a patient of Ms. Candida's.  Pt called stating that thhe Birth Control rx that was written for her is for 28days. But pt states she thought she was being prescribed Loestrin. Is there any way you can submit loestrin rx for pt. Thank you, Graciela  ----- Message -----  From: Jovon Villarreal  Sent: 2/28/2025  11:00 AM CST  To: Nish Tirado Staff    .Type: Patient Call BackWho called:patientWhat is the request in detail:  calling concerning regarding prescription that was prescribed. patient stated the pharmacy informed her that the medication was filled for 28 days but the pharmacy can only do 21days Can the clinic reply by [facility]?   Would the patient rather a call back or a response via [facility] call back number:  .[phone]

## 2025-02-28 NOTE — TELEPHONE ENCOUNTER
----- Message from Iniki sent at 2/28/2025 10:58 AM CST -----  .Type: Patient Call BackWho called:patientWhat is the request in detail:  calling concerning regarding prescription that was prescribed. patient stated the pharmacy informed her that the medication was filled for 28 days but the pharmacy can only do 21days Can the clinic reply by [facility]?   Would the patient rather a call back or a response via [facility] call back number:  .[phone]

## 2025-03-02 LAB
C TRACH DNA SPEC QL NAA+PROBE: NOT DETECTED
N GONORRHOEA DNA SPEC QL NAA+PROBE: NOT DETECTED

## 2025-03-05 ENCOUNTER — RESULTS FOLLOW-UP (OUTPATIENT)
Dept: OBSTETRICS AND GYNECOLOGY | Facility: CLINIC | Age: 35
End: 2025-03-05